# Patient Record
Sex: FEMALE | Race: ASIAN | NOT HISPANIC OR LATINO | Employment: OTHER | URBAN - METROPOLITAN AREA
[De-identification: names, ages, dates, MRNs, and addresses within clinical notes are randomized per-mention and may not be internally consistent; named-entity substitution may affect disease eponyms.]

---

## 2017-03-10 ENCOUNTER — COMMUNICATION - HEALTHEAST (OUTPATIENT)
Dept: FAMILY MEDICINE | Facility: CLINIC | Age: 62
End: 2017-03-10

## 2017-03-10 DIAGNOSIS — F41.9 ANXIETY: ICD-10-CM

## 2017-03-10 DIAGNOSIS — E78.5 HYPERLIPIDEMIA: ICD-10-CM

## 2017-05-27 ENCOUNTER — COMMUNICATION - HEALTHEAST (OUTPATIENT)
Dept: FAMILY MEDICINE | Facility: CLINIC | Age: 62
End: 2017-05-27

## 2017-05-27 DIAGNOSIS — E78.5 HYPERLIPIDEMIA: ICD-10-CM

## 2017-05-27 DIAGNOSIS — F41.9 ANXIETY: ICD-10-CM

## 2017-05-30 ENCOUNTER — COMMUNICATION - HEALTHEAST (OUTPATIENT)
Dept: FAMILY MEDICINE | Facility: CLINIC | Age: 62
End: 2017-05-30

## 2017-05-30 DIAGNOSIS — F41.9 ANXIETY: ICD-10-CM

## 2017-05-30 DIAGNOSIS — E78.5 HYPERLIPIDEMIA: ICD-10-CM

## 2017-07-31 ENCOUNTER — OFFICE VISIT - HEALTHEAST (OUTPATIENT)
Dept: FAMILY MEDICINE | Facility: CLINIC | Age: 62
End: 2017-07-31

## 2017-07-31 ENCOUNTER — COMMUNICATION - HEALTHEAST (OUTPATIENT)
Dept: FAMILY MEDICINE | Facility: CLINIC | Age: 62
End: 2017-07-31

## 2017-07-31 DIAGNOSIS — G25.81 RESTLESS LEG SYNDROME: ICD-10-CM

## 2017-07-31 DIAGNOSIS — E78.2 MIXED HYPERLIPIDEMIA: ICD-10-CM

## 2017-07-31 DIAGNOSIS — F33.9 MAJOR DEPRESSIVE DISORDER, RECURRENT EPISODE (H): ICD-10-CM

## 2017-07-31 LAB
CHOLEST SERPL-MCNC: 203 MG/DL
FASTING STATUS PATIENT QL REPORTED: YES
HDLC SERPL-MCNC: 58 MG/DL
LDLC SERPL CALC-MCNC: 127 MG/DL
TRIGL SERPL-MCNC: 89 MG/DL

## 2017-08-17 ENCOUNTER — COMMUNICATION - HEALTHEAST (OUTPATIENT)
Dept: FAMILY MEDICINE | Facility: CLINIC | Age: 62
End: 2017-08-17

## 2017-08-17 DIAGNOSIS — F41.9 ANXIETY: ICD-10-CM

## 2017-08-17 DIAGNOSIS — E78.5 HYPERLIPIDEMIA: ICD-10-CM

## 2018-01-09 ENCOUNTER — OFFICE VISIT - HEALTHEAST (OUTPATIENT)
Dept: FAMILY MEDICINE | Facility: CLINIC | Age: 63
End: 2018-01-09

## 2018-01-09 DIAGNOSIS — M18.12 DEGENERATIVE ARTHRITIS OF THUMB, LEFT: ICD-10-CM

## 2018-01-09 DIAGNOSIS — G25.81 RESTLESS LEG SYNDROME: ICD-10-CM

## 2018-01-09 LAB — FERRITIN SERPL-MCNC: 163 NG/ML (ref 10–130)

## 2018-01-15 ENCOUNTER — COMMUNICATION - HEALTHEAST (OUTPATIENT)
Dept: FAMILY MEDICINE | Facility: CLINIC | Age: 63
End: 2018-01-15

## 2018-02-06 ENCOUNTER — COMMUNICATION - HEALTHEAST (OUTPATIENT)
Dept: FAMILY MEDICINE | Facility: CLINIC | Age: 63
End: 2018-02-06

## 2018-04-18 ENCOUNTER — COMMUNICATION - HEALTHEAST (OUTPATIENT)
Dept: FAMILY MEDICINE | Facility: CLINIC | Age: 63
End: 2018-04-18

## 2018-04-18 DIAGNOSIS — G25.81 RESTLESS LEG SYNDROME: ICD-10-CM

## 2018-09-12 ENCOUNTER — COMMUNICATION - HEALTHEAST (OUTPATIENT)
Dept: FAMILY MEDICINE | Facility: CLINIC | Age: 63
End: 2018-09-12

## 2018-09-12 DIAGNOSIS — F41.9 ANXIETY: ICD-10-CM

## 2018-09-12 DIAGNOSIS — E78.5 HYPERLIPIDEMIA: ICD-10-CM

## 2018-10-29 ENCOUNTER — OFFICE VISIT - HEALTHEAST (OUTPATIENT)
Dept: FAMILY MEDICINE | Facility: CLINIC | Age: 63
End: 2018-10-29

## 2018-10-29 DIAGNOSIS — E78.5 HYPERLIPIDEMIA: ICD-10-CM

## 2018-10-29 DIAGNOSIS — Z00.00 ROUTINE GENERAL MEDICAL EXAMINATION AT A HEALTH CARE FACILITY: ICD-10-CM

## 2018-10-29 DIAGNOSIS — Z00.00 HEALTH CARE MAINTENANCE: ICD-10-CM

## 2018-10-29 DIAGNOSIS — F33.0 MILD EPISODE OF RECURRENT MAJOR DEPRESSIVE DISORDER (H): ICD-10-CM

## 2018-10-29 DIAGNOSIS — F41.9 ANXIETY: ICD-10-CM

## 2018-10-29 DIAGNOSIS — L29.0 ANAL ITCHING: ICD-10-CM

## 2018-10-29 DIAGNOSIS — Z79.899 MEDICATION MANAGEMENT: ICD-10-CM

## 2018-10-29 DIAGNOSIS — Z12.11 SCREEN FOR COLON CANCER: ICD-10-CM

## 2018-10-29 DIAGNOSIS — Z12.31 VISIT FOR SCREENING MAMMOGRAM: ICD-10-CM

## 2018-10-29 LAB
ALBUMIN SERPL-MCNC: 4 G/DL (ref 3.5–5)
ALP SERPL-CCNC: 46 U/L (ref 45–120)
ALT SERPL W P-5'-P-CCNC: 19 U/L (ref 0–45)
ANION GAP SERPL CALCULATED.3IONS-SCNC: 9 MMOL/L (ref 5–18)
AST SERPL W P-5'-P-CCNC: 17 U/L (ref 0–40)
BILIRUB SERPL-MCNC: 1.7 MG/DL (ref 0–1)
BUN SERPL-MCNC: 13 MG/DL (ref 8–22)
CALCIUM SERPL-MCNC: 9.6 MG/DL (ref 8.5–10.5)
CHLORIDE BLD-SCNC: 106 MMOL/L (ref 98–107)
CHOLEST SERPL-MCNC: 228 MG/DL
CO2 SERPL-SCNC: 26 MMOL/L (ref 22–31)
CREAT SERPL-MCNC: 0.69 MG/DL (ref 0.6–1.1)
FASTING STATUS PATIENT QL REPORTED: YES
GFR SERPL CREATININE-BSD FRML MDRD: >60 ML/MIN/1.73M2
GLUCOSE BLD-MCNC: 96 MG/DL (ref 70–125)
HDLC SERPL-MCNC: 59 MG/DL
HGB BLD-MCNC: 13.2 G/DL (ref 12–16)
LDLC SERPL CALC-MCNC: 146 MG/DL
POTASSIUM BLD-SCNC: 4 MMOL/L (ref 3.5–5)
PROT SERPL-MCNC: 7.1 G/DL (ref 6–8)
SODIUM SERPL-SCNC: 141 MMOL/L (ref 136–145)
TRIGL SERPL-MCNC: 116 MG/DL

## 2018-10-31 ENCOUNTER — COMMUNICATION - HEALTHEAST (OUTPATIENT)
Dept: FAMILY MEDICINE | Facility: CLINIC | Age: 63
End: 2018-10-31

## 2018-11-29 ENCOUNTER — HOSPITAL ENCOUNTER (OUTPATIENT)
Dept: MAMMOGRAPHY | Facility: CLINIC | Age: 63
Discharge: HOME | End: 2018-11-29
Attending: NURSE PRACTITIONER

## 2018-11-29 DIAGNOSIS — Z12.31 VISIT FOR SCREENING MAMMOGRAM: ICD-10-CM

## 2018-11-30 ENCOUNTER — RECORDS - HEALTHEAST (OUTPATIENT)
Dept: ADMINISTRATIVE | Facility: OTHER | Age: 63
End: 2018-11-30

## 2019-05-02 ENCOUNTER — COMMUNICATION - HEALTHEAST (OUTPATIENT)
Dept: TELEHEALTH | Facility: CLINIC | Age: 64
End: 2019-05-02

## 2019-05-02 ENCOUNTER — OFFICE VISIT - HEALTHEAST (OUTPATIENT)
Dept: FAMILY MEDICINE | Facility: CLINIC | Age: 64
End: 2019-05-02

## 2019-05-02 DIAGNOSIS — E78.2 MIXED HYPERLIPIDEMIA: ICD-10-CM

## 2019-05-02 DIAGNOSIS — Z71.84 TRAVEL ADVICE ENCOUNTER: ICD-10-CM

## 2019-05-02 LAB
CHOLEST SERPL-MCNC: 349 MG/DL
FASTING STATUS PATIENT QL REPORTED: YES
HDLC SERPL-MCNC: 64 MG/DL
LDLC SERPL CALC-MCNC: 251 MG/DL
TRIGL SERPL-MCNC: 168 MG/DL

## 2019-05-24 ENCOUNTER — COMMUNICATION - HEALTHEAST (OUTPATIENT)
Dept: FAMILY MEDICINE | Facility: CLINIC | Age: 64
End: 2019-05-24

## 2019-10-14 ENCOUNTER — OFFICE VISIT - HEALTHEAST (OUTPATIENT)
Dept: FAMILY MEDICINE | Facility: CLINIC | Age: 64
End: 2019-10-14

## 2019-10-14 DIAGNOSIS — R05.9 COUGH: ICD-10-CM

## 2019-10-14 DIAGNOSIS — R09.A2 GLOBUS SENSATION: ICD-10-CM

## 2019-10-14 DIAGNOSIS — E78.2 MIXED HYPERLIPIDEMIA: ICD-10-CM

## 2019-10-14 LAB
CHOLEST SERPL-MCNC: 221 MG/DL
FASTING STATUS PATIENT QL REPORTED: YES
HDLC SERPL-MCNC: 53 MG/DL
LDLC SERPL CALC-MCNC: 147 MG/DL
TRIGL SERPL-MCNC: 107 MG/DL
TSH SERPL DL<=0.005 MIU/L-ACNC: 1.44 UIU/ML (ref 0.3–5)

## 2019-10-14 ASSESSMENT — MIFFLIN-ST. JEOR: SCORE: 1116.07

## 2019-10-25 ENCOUNTER — COMMUNICATION - HEALTHEAST (OUTPATIENT)
Dept: FAMILY MEDICINE | Facility: CLINIC | Age: 64
End: 2019-10-25

## 2019-11-08 ENCOUNTER — COMMUNICATION - HEALTHEAST (OUTPATIENT)
Dept: FAMILY MEDICINE | Facility: CLINIC | Age: 64
End: 2019-11-08

## 2019-11-17 ENCOUNTER — COMMUNICATION - HEALTHEAST (OUTPATIENT)
Dept: FAMILY MEDICINE | Facility: CLINIC | Age: 64
End: 2019-11-17

## 2019-11-17 DIAGNOSIS — E78.5 HYPERLIPIDEMIA: ICD-10-CM

## 2019-11-17 DIAGNOSIS — F41.9 ANXIETY: ICD-10-CM

## 2020-03-20 ENCOUNTER — AMBULATORY - HEALTHEAST (OUTPATIENT)
Dept: PHARMACY | Facility: CLINIC | Age: 65
End: 2020-03-20

## 2020-03-20 DIAGNOSIS — F41.9 ANXIETY: ICD-10-CM

## 2020-03-20 DIAGNOSIS — E78.5 HYPERLIPIDEMIA: ICD-10-CM

## 2020-10-04 ENCOUNTER — COMMUNICATION - HEALTHEAST (OUTPATIENT)
Dept: FAMILY MEDICINE | Facility: CLINIC | Age: 65
End: 2020-10-04

## 2020-10-04 DIAGNOSIS — F41.9 ANXIETY: ICD-10-CM

## 2020-10-04 DIAGNOSIS — E78.5 HYPERLIPIDEMIA: ICD-10-CM

## 2020-10-12 ENCOUNTER — OFFICE VISIT - HEALTHEAST (OUTPATIENT)
Dept: FAMILY MEDICINE | Facility: CLINIC | Age: 65
End: 2020-10-12

## 2020-10-12 DIAGNOSIS — F33.0 MILD EPISODE OF RECURRENT MAJOR DEPRESSIVE DISORDER (H): ICD-10-CM

## 2020-10-12 DIAGNOSIS — Z12.31 VISIT FOR SCREENING MAMMOGRAM: ICD-10-CM

## 2020-10-12 DIAGNOSIS — R05.9 COUGH: ICD-10-CM

## 2020-10-12 DIAGNOSIS — F41.9 ANXIETY: ICD-10-CM

## 2020-10-12 DIAGNOSIS — Z00.00 ROUTINE GENERAL MEDICAL EXAMINATION AT A HEALTH CARE FACILITY: ICD-10-CM

## 2020-10-12 DIAGNOSIS — E78.5 HYPERLIPIDEMIA: ICD-10-CM

## 2020-10-12 LAB
ALBUMIN SERPL-MCNC: 4 G/DL (ref 3.5–5)
ALP SERPL-CCNC: 38 U/L (ref 45–120)
ALT SERPL W P-5'-P-CCNC: 17 U/L (ref 0–45)
ANION GAP SERPL CALCULATED.3IONS-SCNC: 7 MMOL/L (ref 5–18)
AST SERPL W P-5'-P-CCNC: 16 U/L (ref 0–40)
BILIRUB SERPL-MCNC: 1.2 MG/DL (ref 0–1)
BUN SERPL-MCNC: 10 MG/DL (ref 8–22)
CALCIUM SERPL-MCNC: 9 MG/DL (ref 8.5–10.5)
CHLORIDE BLD-SCNC: 107 MMOL/L (ref 98–107)
CHOLEST SERPL-MCNC: 213 MG/DL
CO2 SERPL-SCNC: 28 MMOL/L (ref 22–31)
CREAT SERPL-MCNC: 0.74 MG/DL (ref 0.6–1.1)
ERYTHROCYTE [DISTWIDTH] IN BLOOD BY AUTOMATED COUNT: 11.9 % (ref 11–14.5)
FASTING STATUS PATIENT QL REPORTED: YES
GFR SERPL CREATININE-BSD FRML MDRD: >60 ML/MIN/1.73M2
GLUCOSE BLD-MCNC: 92 MG/DL (ref 70–125)
HCT VFR BLD AUTO: 38.8 % (ref 35–47)
HDLC SERPL-MCNC: 48 MG/DL
HGB BLD-MCNC: 13.1 G/DL (ref 12–16)
LDLC SERPL CALC-MCNC: 134 MG/DL
MCH RBC QN AUTO: 30.5 PG (ref 27–34)
MCHC RBC AUTO-ENTMCNC: 33.8 G/DL (ref 32–36)
MCV RBC AUTO: 90 FL (ref 80–100)
PLATELET # BLD AUTO: 247 THOU/UL (ref 140–440)
PMV BLD AUTO: 7.9 FL (ref 7–10)
POTASSIUM BLD-SCNC: 4.1 MMOL/L (ref 3.5–5)
PROT SERPL-MCNC: 7 G/DL (ref 6–8)
RBC # BLD AUTO: 4.29 MILL/UL (ref 3.8–5.4)
SODIUM SERPL-SCNC: 142 MMOL/L (ref 136–145)
TRIGL SERPL-MCNC: 153 MG/DL
WBC: 4.5 THOU/UL (ref 4–11)

## 2020-10-12 RX ORDER — BENZONATATE 100 MG/1
100 CAPSULE ORAL EVERY 6 HOURS PRN
Qty: 30 CAPSULE | Refills: 0 | Status: SHIPPED | OUTPATIENT
Start: 2020-10-12 | End: 2022-03-31

## 2020-10-12 RX ORDER — SIMVASTATIN 40 MG
TABLET ORAL
Qty: 90 TABLET | Refills: 4 | Status: SHIPPED | OUTPATIENT
Start: 2020-10-12 | End: 2022-03-31

## 2020-10-12 ASSESSMENT — PATIENT HEALTH QUESTIONNAIRE - PHQ9: SUM OF ALL RESPONSES TO PHQ QUESTIONS 1-9: 2

## 2020-10-12 ASSESSMENT — MIFFLIN-ST. JEOR: SCORE: 1123

## 2020-10-13 LAB
HPV SOURCE: NORMAL
HUMAN PAPILLOMA VIRUS 16 DNA: NEGATIVE
HUMAN PAPILLOMA VIRUS 18 DNA: NEGATIVE
HUMAN PAPILLOMA VIRUS FINAL DIAGNOSIS: NORMAL
HUMAN PAPILLOMA VIRUS OTHER HR: NEGATIVE
SPECIMEN DESCRIPTION: NORMAL

## 2020-10-20 LAB
BKR LAB AP ABNORMAL BLEEDING: NO
BKR LAB AP BIRTH CONTROL/HORMONES: NORMAL
BKR LAB AP CERVICAL APPEARANCE: NORMAL
BKR LAB AP GYN ADEQUACY: NORMAL
BKR LAB AP GYN INTERPRETATION: NORMAL
BKR LAB AP HPV REFLEX: NORMAL
BKR LAB AP LMP: NORMAL
BKR LAB AP PATIENT STATUS: NORMAL
BKR LAB AP PREVIOUS ABNORMAL: NO
BKR LAB AP PREVIOUS NORMAL: 2014
HIGH RISK?: NO
PATH REPORT.COMMENTS IMP SPEC: NORMAL
RESULT FLAG (HE HISTORICAL CONVERSION): NORMAL

## 2020-10-21 ENCOUNTER — COMMUNICATION - HEALTHEAST (OUTPATIENT)
Dept: FAMILY MEDICINE | Facility: CLINIC | Age: 65
End: 2020-10-21

## 2020-10-30 ENCOUNTER — COMMUNICATION - HEALTHEAST (OUTPATIENT)
Dept: FAMILY MEDICINE | Facility: CLINIC | Age: 65
End: 2020-10-30

## 2020-11-03 ENCOUNTER — COMMUNICATION - HEALTHEAST (OUTPATIENT)
Dept: FAMILY MEDICINE | Facility: CLINIC | Age: 65
End: 2020-11-03

## 2021-05-25 ENCOUNTER — RECORDS - HEALTHEAST (OUTPATIENT)
Dept: ADMINISTRATIVE | Facility: CLINIC | Age: 66
End: 2021-05-25

## 2021-05-27 ENCOUNTER — RECORDS - HEALTHEAST (OUTPATIENT)
Dept: ADMINISTRATIVE | Facility: CLINIC | Age: 66
End: 2021-05-27

## 2021-05-27 ASSESSMENT — PATIENT HEALTH QUESTIONNAIRE - PHQ9: SUM OF ALL RESPONSES TO PHQ QUESTIONS 1-9: 2

## 2021-05-28 ENCOUNTER — RECORDS - HEALTHEAST (OUTPATIENT)
Dept: ADMINISTRATIVE | Facility: CLINIC | Age: 66
End: 2021-05-28

## 2021-05-28 NOTE — PROGRESS NOTES
ASSESSMENT/PLAN:  1. Mixed hyperlipidemia  - Lipid Cascade FASTING    2. Travel advice encounter  63-year-old female is planning a trip to [facility] and [facility].  We discussed travel vaccinations based on the [facility] website.  She is initiated on her hepatitis B and a vaccinations.  She is given a booster of MMR.  She is given a prescription for typhoid oral immunization and Malarone for malaria prophylaxis.  She instructed on side effects and use these medications.  She will follow-up for subsequent dosing of her hepatitis A and B vaccinations.  - typhoid (VIVOTIF) SR capsule; Take 1 capsule by mouth every other day. Complete prior to travel  Dispense: 4 capsule; Refill: 0  - atovaquone-proguanil (MALARONE) 250-100 mg Tab; Take 1 tablet by mouth daily with breakfast. Start 1-2 days prior to travel and continue 7 days after return  Dispense: 35 tablet; Refill: 0      Return in about 5 months (around 10/2/2019) for annual physical, or sooner as needed.    Patient Instructions   Complete the oral typhoid medication before you go.  - Take this as prescribed- every other day for 4 doses.    The malaria medication is sent to your pharmacy.   - Start this before you travel.  - You will take this before, during, and after your travel.  - This will be a daily medication.    MMR, Hepatis A, and Hepatatis B immunizations given today.    Labs done today, we will call you with the results.      Orders Placed This Encounter   Procedures     Hepatitis A adult 2 dose IM (19 yr & older)     Hepatitis B Vaccine Age 20 years and above     MMR vaccine subcutaneous     Lipid Cascade FASTING     Order Specific Question:   Fasting is required?     Answer:   Yes     There are no discontinued medications.      CHIEF COMPLAINT;  Chief Complaint   Patient presents with     Travel Consult       HISTORY OF PRESENT ILLNESS:  Chrystal is a 63 y.o. female presenting to the clinic today for travel consult.     Travel: She is traveling to [facility] with her sister.  She is leaving 6/7/19 and returning 6/28/19. She is agreeable to receiving the MMR, hepatitis A, and hepatitis B immunizations today.     Depression: She continues on sertraline 50 mg daily and reports no adverse side effects. She feels this works well for her.     Hyperlipidemia: She is fasting today and requests fasting lipid labs. She continues on simvastatin 40 mg daily and reports no adverse side effects.     REVIEW OF SYSTEMS:  All other systems are negative.    PFSH:  She is traveling to [facility].     TOBACCO USE:  Social History     Tobacco Use   Smoking Status Never Smoker   Smokeless Tobacco Never Used       VITALS:  Vitals:    05/02/19 1525   BP: 130/80   Patient Site: Left Arm   Patient Position: Sitting   Cuff Size: Adult Regular   Pulse: 73   SpO2: 96%   Weight: 150 lb (68 kg)     Wt Readings from Last 3 Encounters:   05/02/19 150 lb (68 kg)   01/09/18 148 lb (67.1 kg)   07/31/17 142 lb 14.4 oz (64.8 kg)     Body mass index is 29.29 kg/m .    PHYSICAL EXAM:  GENERAL APPEARANCE: Alert, cooperative, no distress, appears stated age  HEAD: Normocephalic, without obvious abnormality, atraumatic  NEUROLOGIC: CNII-XII intact.     RECENT RESULTS  No results found for this or any previous visit (from the past 48 hour(s)).    ADDITIONAL HISTORY SUMMARIZED (2): None.  DECISION TO OBTAIN EXTRA INFORMATION (1): [facility] website referenced for travel to [facility].  RADIOLOGY TESTS (1): None.  LABS (1): Labs ordered today.  MEDICINE TESTS (1): None.  INDEPENDENT REVIEW (2 each): None.    The visit lasted a total of 11 minutes face to face with the patient. Over 50% of the time was spent counseling and educating the patient about upcoming travel.    ILindsay, am scribing for and in the presence of, Dr. Coates.    I, Dr. Coates, personally performed the services described in this documentation, as scribed by Lindsay Fairbanks in my presence, and it is both accurate and complete.    Dragon dictation was used for this note.   Speech recognition errors are a possibility.    MEDICATIONS:  Current Outpatient Medications   Medication Sig Dispense Refill     sertraline (ZOLOFT) 50 MG tablet TAKE 1 TABLET DAILY 90 tablet 3     simvastatin (ZOCOR) 40 MG tablet Take 1 tablet (40 mg total) by mouth at bedtime. 90 tablet 3     atovaquone-proguanil (MALARONE) 250-100 mg Tab Take 1 tablet by mouth daily with breakfast. Start 1-2 days prior to travel and continue 7 days after return 35 tablet 0     typhoid (VIVOTIF) SR capsule Take 1 capsule by mouth every other day. Complete prior to travel 4 capsule 0     No current facility-administered medications for this visit.        Total data points: 2

## 2021-05-28 NOTE — PATIENT INSTRUCTIONS - HE
Complete the oral typhoid medication before you go.  - Take this as prescribed- every other day for 4 doses.    The malaria medication is sent to your pharmacy.   - Start this before you travel.  - You will take this before, during, and after your travel.  - This will be a daily medication.    MMR, Hepatis A, and Hepatatis B immunizations given today.    Labs done today, we will call you with the results.

## 2021-05-29 ENCOUNTER — RECORDS - HEALTHEAST (OUTPATIENT)
Dept: ADMINISTRATIVE | Facility: CLINIC | Age: 66
End: 2021-05-29

## 2021-05-29 NOTE — TELEPHONE ENCOUNTER
Please advise on 5/2/2019 lipid cascade    Lab Results   Component Value Date    CHOL 349 (H) 05/02/2019    CHOL 228 (H) 10/29/2018    CHOL 203 (H) 07/31/2017     Lab Results   Component Value Date    HDL 64 05/02/2019    HDL 59 10/29/2018    HDL 58 07/31/2017     Lab Results   Component Value Date    LDLCALC 251 (H) 05/02/2019    LDLCALC 146 (H) 10/29/2018    LDLCALC 127 07/31/2017     Lab Results   Component Value Date    TRIG 168 (H) 05/02/2019    TRIG 116 10/29/2018    TRIG 89 07/31/2017     No components found for: CHOLHDL

## 2021-05-29 NOTE — TELEPHONE ENCOUNTER
Test Results  Who is calling?: Patient    Who ordered the test:   Divine Coates MD     Type of test: Lab  Date of test:  05/02/2019  Where was the test performed: [facility]  What are your questions/concerns?:  Patient requesting a return phone call to discuss her lab results.  Okay to leave a detailed message?:  Yes

## 2021-05-30 ENCOUNTER — RECORDS - HEALTHEAST (OUTPATIENT)
Dept: ADMINISTRATIVE | Facility: CLINIC | Age: 66
End: 2021-05-30

## 2021-05-31 VITALS — BODY MASS INDEX: 28.9 KG/M2 | WEIGHT: 148 LBS

## 2021-05-31 VITALS — WEIGHT: 142.9 LBS | BODY MASS INDEX: 27.91 KG/M2

## 2021-06-02 NOTE — PATIENT INSTRUCTIONS - HE
TREATMENT OF SORE THROAT  Sore throat treatment: Antibiotics do not help throat pain caused by a virus and are not recommended as they only for bacterial infections. Most sore throats are caused by viruses. Inappropriate use of antibiotics for viral illness can unnecessarily expose patients to side effects like diarrhea, rash, or more serious allergic reactions. Sore throat caused by viral infections usually lasts four to five days. During this time, treatments to reduce pain may be helpful. Several therapies can help to relieve throat pain.  Pain medication -- Over the counter pain relievers such as acetaminophen (Tylenol ) or a non-steroidal anti-inflammatory agent such as ibuprofen or naproxen (Motrin  or Aleve ) have been shown to provide fast and effective relief of sore throat pain.  Oral rinses -- Salt-water gargles are an old stand-by for throat pain. It is not clear that salt water works to relieve pain, but it is unlikely to be harmful. Most recipes suggest 1/4 to 1/2 teaspoon (1.5 to 3.0 g) of salt per one cup (8 ounces or 250 mL) of warm water.   Sprays -- Sprays containing topical anesthetics (eg, benzocaine, phenol) are available to treat sore throat. However, such sprays are no more effective than sucking on hard candy and it is important not to swallow the liquid.  Lozenges -- A variety of lozenges (cough drops) containing topical anesthetics are available to treat throat pain or relieve dryness Lozenges may persist longer in the throat than sprays or gargles and thus, may be more effective for symptom relief [2].  Other treatments -- Other treatments that may help with throat pain include sipping warm beverages (eg, honey or lemon tea, chicken soup), cold beverages, or eating cold or frozen desserts (eg, ice cream, popsicles).  Honey - Honey is a natural mucolytic and can make sore throats with phlegm feel better. Many patients like to mix it with lemon or lemon tea.    Dr. Ku's recipe:  - 1  tablespoon lemon juice  - 3-4 tablespoons water  - honey (about a teaspoon but samantha to taste)  Heat in a small glass and try before bedtime. Veronica with the recipe to your taste preference.

## 2021-06-02 NOTE — PROGRESS NOTES
Assessment/Plan:     Patient presents to clinic for a cough and globus sensation. She has had this before but cannot remember the name of the medication which treated it. Her lung exam was unremarkable, trace erythema in the pharynx with possible bilateral tonsilar edema but no thyroid enlargement appreciated. We chose to do the following:  -TSH pending  - tessalon perles for the cough  - symptomatic handout on sore throat treatment  - increase hydration  - ENT referral if these are insufficient    Repeat lipid panel today.    Problem List Items Addressed This Visit        ENT/CARD/PULM/ENDO Problems    Hyperlipidemia - Primary    Relevant Orders    Lipid [place] FASTING      Other Visit Diagnoses     Globus sensation        Relevant Orders    Ambulatory referral to ENT    Thyroid Stimulating Hormone (TSH)    Cough        Relevant Medications    benzonatate (TESSALON PERLES) 100 MG capsule          Return to clinic in 2 weeks if not improved.     Total time spent with patient was 15 minutes with greater than 50% spent in face-to-face counseling regarding the above plan.    Subjective:      Chrystal Sewell is a 64 y.o. female who presents to clinic for cough and fasting cholesterol screening.    Patient has been experiencing a coughing sensation x 2-3 weeks. It feels as though something is caught in her throat but she cannot clear it. She has had this sensation before, 'a long time ago' which resolved with medication. She cannot remember the name of the medication. Her cough is dry and her throat is itchy. No one else is sick or feeling the same symptoms. She denies shortness of breath and chest pain. She denies dysphagia or dysphonia. She denies allergy symptoms. She has not been hydrating well. She has been using cough drops.     Past Medical History, Family History, and Social History reviewed.     Current Outpatient Medications on File Prior to Visit   Medication Sig Dispense Refill     atovaquone-proguanil (MALARONE)  250-100 mg Tab Take 1 tablet by mouth daily with breakfast. Start 1-2 days prior to travel and continue 7 days after return 35 tablet 0     sertraline (ZOLOFT) 50 MG tablet TAKE 1 TABLET DAILY 90 tablet 3     simvastatin (ZOCOR) 40 MG tablet Take 1 tablet (40 mg total) by mouth at bedtime. 90 tablet 3     typhoid (VIVOTIF) SR capsule Take 1 capsule by mouth every other day. Complete prior to travel 4 capsule 0     No current facility-administered medications on file prior to visit.        Review of systems is as stated in HPI.  Endorses: back pain  The remainder of the 10 system review is otherwise negative.    Objective:     /82   Pulse 65   Ht 5' (1.524 m)   Wt 141 lb (64 kg)   SpO2 96%   BMI 27.54 kg/m   Body mass index is 27.54 kg/m .    Gen: Alert, NAD, appears stated age, normal hygiene   Eyes: conjunctivae without injection, sclera clear, EOMI  ENT/mouth: nares clear, septum midline, absent rhinorrhea, pharyngeal injection present; no thyroid enlargement, neck is supple  Resp: CTAB, no wheezes, rales or ronchi but a productive and raspy cough appreciated    This note has been dictated using voice recognition software. Any grammatical or context distortions are unintentional and inherent to the the software.     Merlyn Tatum MD

## 2021-06-03 VITALS
HEIGHT: 60 IN | OXYGEN SATURATION: 96 % | SYSTOLIC BLOOD PRESSURE: 118 MMHG | DIASTOLIC BLOOD PRESSURE: 82 MMHG | WEIGHT: 141 LBS | HEART RATE: 65 BPM | BODY MASS INDEX: 27.68 KG/M2

## 2021-06-03 VITALS — WEIGHT: 150 LBS | BODY MASS INDEX: 29.29 KG/M2

## 2021-06-03 NOTE — TELEPHONE ENCOUNTER
Refill Approved    Rx renewed per Medication Renewal Policy. Medication was last renewed on 10/29/18.    Alessandra Cali, [facility] Triage/Med Refill 11/17/2019     Requested Prescriptions   Pending Prescriptions Disp Refills     sertraline (ZOLOFT) 50 MG tablet [Pharmacy Med Name: SERTRALINE HCL TABS 50MG] 90 tablet 4     Sig: TAKE 1 TABLET DAILY       SSRI Refill Protocol  Passed - 11/17/2019  5:19 AM        Passed - PCP or prescribing provider visit in last year     Last office visit with prescriber/PCP: 12/9/2015 Nazanin Almanza CNP OR same dept: 10/14/2019 Merlyn Tatum MD OR same specialty: 10/14/2019 Merlyn Tatum MD  Last physical: 10/29/2018 Last MTM visit: Visit date not found   Next visit within 3 mo: Visit date not found  Next physical within 3 mo: Visit date not found  Prescriber OR PCP: Nazanin Almanza CNP  Last diagnosis associated with med order: 1. Anxiety  - sertraline (ZOLOFT) 50 MG tablet [Pharmacy Med Name: SERTRALINE HCL TABS 50MG]; TAKE 1 TABLET DAILY  Dispense: 90 tablet; Refill: 4    2. Hyperlipidemia  - simvastatin (ZOCOR) 40 MG tablet [Pharmacy Med Name: SIMVASTATIN TABS 40MG]; TAKE 1 TABLET AT BEDTIME  Dispense: 90 tablet; Refill: 4    If protocol passes may refill for 12 months if within 3 months of last provider visit (or a total of 15 months).             simvastatin (ZOCOR) 40 MG tablet [Pharmacy Med Name: SIMVASTATIN TABS 40MG] 90 tablet 4     Sig: TAKE 1 TABLET AT BEDTIME       Statins Refill Protocol (Hmg CoA Reductase Inhibitors) Passed - 11/17/2019  5:19 AM        Passed - PCP or prescribing provider visit in past 12 months      Last office visit with prescriber/PCP: 12/9/2015 Nazanin Almanza CNP OR same dept: 10/14/2019 Merlyn Tatum MD OR same specialty: 10/14/2019 Merlyn Tatum MD  Last physical: 10/29/2018 Last MTM visit: Visit date not found   Next visit within 3 mo: Visit date not found  Next physical within 3 mo: Visit date not  found  Prescriber OR PCP: Nazanin Almanza CNP  Last diagnosis associated with med order: 1. Anxiety  - sertraline (ZOLOFT) 50 MG tablet [Pharmacy Med Name: SERTRALINE HCL TABS 50MG]; TAKE 1 TABLET DAILY  Dispense: 90 tablet; Refill: 4    2. Hyperlipidemia  - simvastatin (ZOCOR) 40 MG tablet [Pharmacy Med Name: SIMVASTATIN TABS 40MG]; TAKE 1 TABLET AT BEDTIME  Dispense: 90 tablet; Refill: 4    If protocol passes may refill for 12 months if within 3 months of last provider visit (or a total of 15 months).

## 2021-06-05 ENCOUNTER — RECORDS - HEALTHEAST (OUTPATIENT)
Dept: FAMILY MEDICINE | Facility: CLINIC | Age: 66
End: 2021-06-05

## 2021-06-05 VITALS
BODY MASS INDEX: 27.82 KG/M2 | SYSTOLIC BLOOD PRESSURE: 120 MMHG | OXYGEN SATURATION: 98 % | HEIGHT: 60 IN | DIASTOLIC BLOOD PRESSURE: 80 MMHG | HEART RATE: 68 BPM | WEIGHT: 141.7 LBS

## 2021-06-05 DIAGNOSIS — E80.6 DISORDER OF BILIRUBIN EXCRETION: ICD-10-CM

## 2021-06-12 NOTE — PROGRESS NOTES
ASSESSMENT/PLAN:  1. Routine general medical examination at a health care facility  Healthy 65-year-old female.  She is due for a Pap smear and this is completed today.  She is referred for her first bone density scan.  We discussed the importance of calcium and vitamin D supplementation.  She is up-to-date on her immunizations.  She received her flu vaccination today.  She declines shingles vaccination.  She is referred for her yearly mammogram.  She did Cologuard in 2018 which was negative and she will be due for repeat colon cancer screening in 1 year.  - Gynecologic Cytology (PAP Smear)  - DXA Bone Density Scan; Future    2. Anxiety  Anxiety has been well controlled on the sertraline and she will continue with this medication.  - sertraline (ZOLOFT) 50 MG tablet; TAKE 1 TABLET (50 MG TOTAL) BY MOUTH DAILY/ NOJ 1 LUB TXHUA HNUB PAB NYUAJ SIAB  Dispense: 90 tablet; Refill: 4    3. Hyperlipidemia  She is tolerating the simvastatin well.  She takes 40 mg daily.  She is fasting and we will check a lipid cascade today.  In addition we will check CMP and heme to for monitoring.  - simvastatin (ZOCOR) 40 MG tablet; TAKE 1 TABLET (40 MG TOTAL) BY MOUTH AT BEDTIME/ TXHUA HMO THAUM MUS PW NOJ 1 LUB PAB ZOO NTSHAV MUAJ ROJ  Dispense: 90 tablet; Refill: 4  - HM2(CBC w/o Differential)  - Comprehensive Metabolic Panel  - Lipid Cascade FASTING    4. Visit for screening mammogram  - Mammo Screening Bilateral; Future    5. Mild episode of recurrent major depressive disorder (H)    6. Cough  She has an intermittent cough.  This was pre-existing prior to COVID.  She likes to have Tessalon Perles on hand to be used as needed.  This prescription is renewed.  - benzonatate (TESSALON PERLES) 100 MG capsule; Take 1 capsule (100 mg total) by mouth every 6 (six) hours as needed for cough.  Dispense: 30 capsule; Refill: 0        Dragon dictation was used for this note.  Speech recognition errors are a possibility.      No follow-ups on  file.  There are no Patient Instructions on file for this visit.    Orders Placed This Encounter   Procedures     Mammo Screening Bilateral     Standing Status:   Future     Standing Expiration Date:   1/12/2022     Order Specific Question:   Patient's previous breast density:     Answer:   Scattered fibroglandular density [2]     Order Specific Question:   Can the procedure be changed per Radiologist protocol?     Answer:   Yes     DXA Bone Density Scan     Standing Status:   Future     Standing Expiration Date:   10/12/2021     Order Specific Question:   Reason for Exam (Describe Symptoms):     Answer:   screening     Order Specific Question:   Can the procedure be changed per Radiologist protocol?     Answer:   Yes     Influenza, Recombinant, Inj, Quadrivalent, PF, 18+YRS     HM2(CBC w/o Differential)     Comprehensive Metabolic Panel     Lipid [place] FASTING     Order Specific Question:   Fasting is required?     Answer:   Yes     Medications Discontinued During This Encounter   Medication Reason     atovaquone-proguanil (MALARONE) 250-100 mg Tab Therapy completed     typhoid (VIVOTIF) SR capsule Therapy completed     benzonatate (TESSALON PERLES) 100 MG capsule Reorder     sertraline (ZOLOFT) 50 MG tablet Reorder     simvastatin (ZOCOR) 40 MG tablet Reorder         CHIEF COMPLAINT:  Chief Complaint   Patient presents with     Annual Exam     Flu Vaccine       HISTORY OF PRESENT ILLNESS:  Chrystal is a 65 y.o. female presenting to the clinic today for an annual exam.  She knows she is due for a Pap smear.  She is reminded that her last one was in 2014.  She states that her sister who is a family doctor encourages her to get this done.  She is due for a mammogram.  Her last one was in 2008.  She is up-to-date on her colon cancer screening which she did the Cologuard for.  She is had no new symptoms.  She has been trying to stay active with walking and remain healthy.  Her  is suffering from gastric cancer  which has been stressed for her in addition to the COVID crisis.  She states her moods been fine and anxiety has been overall stable on the sertraline and she like to continue with this.  No new concerns today.    Remainder of 12-point ROS is negative.      Social History     Tobacco Use   Smoking Status Never Smoker   Smokeless Tobacco Never Used       Family History   Problem Relation Age of Onset     No Medical Problems Mother      No Medical Problems Father      No Medical Problems Sister      No Medical Problems Daughter      No Medical Problems Maternal Grandmother      No Medical Problems Maternal Grandfather      No Medical Problems Paternal Grandmother      No Medical Problems Paternal Grandfather      No Medical Problems Maternal Aunt      No Medical Problems Paternal Aunt      BRCA 1/2 Neg Hx      Breast cancer Neg Hx      Cancer Neg Hx      Colon cancer Neg Hx      Endometrial cancer Neg Hx      Ovarian cancer Neg Hx        Social History     Socioeconomic History     Marital status:      Spouse name: Not on file     Number of children: Not on file     Years of education: Not on file     Highest education level: Not on file   Occupational History     Not on file   Social Needs     Financial resource strain: Not on file     Food insecurity     Worry: Not on file     Inability: Not on file     Transportation needs     Medical: Not on file     Non-medical: Not on file   Tobacco Use     Smoking status: Never Smoker     Smokeless tobacco: Never Used   Substance and Sexual Activity     Alcohol use: No     Drug use: No     Sexual activity: Not on file     Comment:    Lifestyle     Physical activity     Days per week: Not on file     Minutes per session: Not on file     Stress: Not on file   Relationships     Social connections     Talks on phone: Not on file     Gets together: Not on file     Attends Buddhism service: Not on file     Active member of club or organization: Not on file     Attends  "meetings of clubs or organizations: Not on file     Relationship status: Not on file     Intimate partner violence     Fear of current or ex partner: Not on file     Emotionally abused: Not on file     Physically abused: Not on file     Forced sexual activity: Not on file   Other Topics Concern     Not on file   Social History Narrative     Not on file       No past surgical history on file.    No Known Allergies    Active Ambulatory Problems     Diagnosis Date Noted     Major Depression, Recurrent      Hyperlipidemia      Primary Snoring      Hyperbilirubinemia      Restless leg syndrome 07/31/2017     Cough 10/12/2020     Resolved Ambulatory Problems     Diagnosis Date Noted     Abdominal Pain In The [place] (Epigastric)      Palpitations      Rosacea      Serum total bilirubin elevated 05/26/2016     No Additional Past Medical History       VITALS:  Vitals:    10/12/20 0857   BP: 120/80   Patient Site: Left Arm   Patient Position: Sitting   Cuff Size: Adult Regular   Pulse: 68   SpO2: 98%   Weight: 141 lb 11.2 oz (64.3 kg)   Height: 5' 0.24\" (1.53 m)     Wt Readings from Last 3 Encounters:   10/12/20 141 lb 11.2 oz (64.3 kg)   10/14/19 141 lb (64 kg)   05/02/19 150 lb (68 kg)     Body mass index is 27.46 kg/m .    PHYSICAL EXAM:  General Appearance: Alert, pleasant, appears stated age  Head: Normocephalic, without obvious abnormality  Eyes: PERRL, conjunctiva/corneas clear, EOM's intact  Ears: Normal TM's and external ear canals, both ears  Nose: Nares normal, septum midline,mucosa normal, no drainage  Throat: Lips, mucosa, and tongue normal; teeth and gums normal; oropharynx is clear  Neck: Supple,without lymphadenopathy or thyromegaly  Lungs: Clear to auscultation bilaterally, respirations unlabored  Breasts: No palpable masses, tenderness, asymmetry, or nipple discharge. No axillary or supraclavicular lymphadenopathy  Heart: Regular rate and rhythm, no murmur   Abdomen: Soft, non-tender, no masses, " no organomegaly  Pelvic: Normally developed genitalia with no external lesions or eruptions. Vagina and cervix show no lesions. No cystocele, No rectocele. Uterus normal.  No adnexal mass or tenderness.  Extremities: Extremities with strong and symmetric pulses, no cyanosis or edema  Skin: Skin color, texture normal, no rashes or lesions  Neuro: Normal    RECENT RESULTS  Recent Results (from the past 48 hour(s))   HM2(CBC w/o Differential)    Collection Time: 10/12/20  9:42 AM   Result Value Ref Range    WBC 4.5 4.0 - 11.0 thou/uL    RBC 4.29 3.80 - 5.40 mill/uL    Hemoglobin 13.1 12.0 - 16.0 g/dL    Hematocrit 38.8 35.0 - 47.0 %    MCV 90 80 - 100 fL    MCH 30.5 27.0 - 34.0 pg    MCHC 33.8 32.0 - 36.0 g/dL    RDW 11.9 11.0 - 14.5 %    Platelets 247 140 - 440 thou/uL    MPV 7.9 7.0 - 10.0 fL       MEDICATIONS:  Current Outpatient Medications   Medication Sig Dispense Refill     benzonatate (TESSALON PERLES) 100 MG capsule Take 1 capsule (100 mg total) by mouth every 6 (six) hours as needed for cough. 30 capsule 0     sertraline (ZOLOFT) 50 MG tablet TAKE 1 TABLET (50 MG TOTAL) BY MOUTH DAILY/ NOJ 1 LUB TXHUA HNUB PAB NYUAJ SIAB 90 tablet 4     simvastatin (ZOCOR) 40 MG tablet TAKE 1 TABLET (40 MG TOTAL) BY MOUTH AT BEDTIME/ TXA HMO THAUM MUS PW NOJ 1 LUB PAB ZOO NTSHAV MUAJ ROJ 90 tablet 4     No current facility-administered medications for this visit.

## 2021-06-12 NOTE — PROGRESS NOTES
ASSESSMENT:  1. Mixed hyperlipidemia  She has been taking her simvastatin without side effects.  She is at 40 mg daily.  Will check a fasting lipid cascade and liver tests.  She has a history of having a slightly elevated bilirubin level.  Workup in the past has been unremarkable for this and it has remained stable.  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  - HM2(CBC w/o Differential)  - Comprehensive Metabolic Panel  - Lipid Cascade FASTING    2. Major depressive disorder, recurrent episode  Her mood is been stable.  She is on sertraline at 50 mg daily.  She does not endorse any depressive symptoms currently.    3. Restless leg syndrome  She does not feel the ropinirole has worked well for up until this point.  She was on a very low dose and I recommend that we increase the dose before we try any medication.  - rOPINIRole (REQUIP) 0.5 MG tablet; Take 1 tab po qhs  Dispense: 90 tablet; Refill: 4          PLAN:  There are no Patient Instructions on file for this visit.    Orders Placed This Encounter   Procedures     HM2(CBC w/o Differential)     Comprehensive Metabolic Panel     Lipid [facility] FASTING     Order Specific Question:   Fasting is required?     Answer:   Yes     Medications Discontinued During This Encounter   Medication Reason     rOPINIRole (REQUIP) 0.25 MG tablet        No Follow-up on file.    CHIEF COMPLAINT:  Chief Complaint   Patient presents with     cholesterol check     fasting     medication check     requip       HISTORY OF PRESENT ILLNESS:  Chrystal is a 61 y.o. female presenting to the clinic today with concerns for medication management. She states that she is no longer taking 0.25 mg ropinirole because she did not notice any change. She denies any side effects associated with this medication. She describes her legs feeling tingly when she is laying in bed and needing to move them to be comfortable.     REVIEW OF SYSTEMS:   Her mood and depression is well-controlled with 50 mg sertraline daily. She  denies sadness or difficulty sleeping and has felt properly motivated. Her cholesterol is seemingly well-controled with 40 mg simvastatin daily, she denies any side effects and is amenable to having labs drawn today. She would like refills of all medications today. All other systems are negative.    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 She tries to go for a walk everyday and states that she wants to lose weight.    TOBACCO USE:  History   Smoking Status     Never Smoker   Smokeless Tobacco     Not on file       VITALS:  Vitals:    07/31/17 1025   BP: 120/80   Patient Site: Left Arm   Patient Position: Sitting   Cuff Size: Adult Regular   Pulse: 72   Resp: 14   Weight: 142 lb 14.4 oz (64.8 kg)     Wt Readings from Last 3 Encounters:   07/31/17 142 lb 14.4 oz (64.8 kg)   05/26/16 143 lb (64.9 kg)   04/12/16 147 lb (66.7 kg)     Body mass index is 27.91 kg/(m^2).    PHYSICAL EXAM:    General Appearance:  Alert, cooperative, no distress, appears stated age   HEENT:  Normal.  No acute findings.   Neck: Supple, symmetrical, no adenopathy.   Lungs:   Clear to auscultation bilaterally, respirations unlabored.  No expiratory wheeze or inspiratory crackles noted.   Heart:  Regular rate and rhythm, S1, S2 normal, no murmur, rub or gallop   Abdomen:   Soft, non-tender, positive bowel sounds, no masses, no organomegaly   Extremities: Extremities normal.  No cyanosis or edema   Skin: Warm, dry.  Skin color, texture, turgor normal, no rashes or lesions   Neurologic: Nonfocal.         ADDITIONAL HISTORY SUMMARIZED (2): None.  DECISION TO OBTAIN EXTRA INFORMATION (1): None.   RADIOLOGY TESTS (1): None.  LABS (1): Labs ordered.  MEDICINE TESTS (1): None.  INDEPENDENT REVIEW (2 each): None.     The visit lasted a total of 7 minutes face to face with the patient. Over 50% of the time was spent counseling and educating the patient about medication management.    Denia WILKINS, am scribing for and in the  presence of, Dr. Coates.    I, Dr. Coates, personally performed the services described in this documentation, as scribed by Denia Fisher in my presence, and it is both accurate and complete.    MEDICATIONS:  Current Outpatient Prescriptions   Medication Sig Dispense Refill     sertraline (ZOLOFT) 50 MG tablet Take 1 tablet (50 mg total) by mouth daily. No further refills until seen in clinic 90 tablet 0     simvastatin (ZOCOR) 40 MG tablet Take 1 tablet (40 mg total) by mouth at bedtime. Must be seen in clinic for more refills. 90 tablet 0     rOPINIRole (REQUIP) 0.5 MG tablet Take 1 tab po qhs 90 tablet 4     No current facility-administered medications for this visit.        Total data points: 1

## 2021-06-12 NOTE — TELEPHONE ENCOUNTER
I reached out to pt and we spoke. I relayed the result message below from Dr. Coates. Pt does not have any questions at this time.

## 2021-06-12 NOTE — TELEPHONE ENCOUNTER
----- Message from Fauzia Quispe CMA sent at 10/30/2020 11:20 AM CDT -----    ----- Message -----  From: Divine Coates MD  Sent: 10/30/2020  11:20 AM CDT  To: Divine Coates Care Team Pool    Please call pt and let her know her results are stable.  She has a mild increase in her cholesterol however I would not recommend cholesterol medication at this time.  Recheck in 1 year.  Let me know she has any questions.  She requests call at the number listed as work

## 2021-06-12 NOTE — TELEPHONE ENCOUNTER
Due to be seen    Rx renewed per Medication Renewal Policy. Medication was last renewed on 3/20/20.    Alessandra Cali, [facility] Triage/Med Refill 10/6/2020     Requested Prescriptions   Pending Prescriptions Disp Refills     sertraline (ZOLOFT) 50 MG tablet [Pharmacy Med Name: SERTRALINE HCL 50 MG TABLET 50 Tablet] 30 tablet 1     Sig: TAKE 1 TABLET (50 MG TOTAL) BY MOUTH DAILY/ NOJ 1 LUB [facility] NYUAJ SIAB       SSRI Refill Protocol  Passed - 10/4/2020 10:22 AM        Passed - PCP or prescribing provider visit in last year     Last office visit with prescriber/PCP: 5/2/2019 Divine Coates MD OR same dept: 10/14/2019 Merlyn Tatum MD OR same specialty: 10/14/2019 Merlyn Tatum MD  Last physical: Visit date not found Last MTM visit: Visit date not found   Next visit within 3 mo: Visit date not found  Next physical within 3 mo: Visit date not found  Prescriber OR PCP: Divine Coates MD  Last diagnosis associated with med order: 1. Anxiety  - sertraline (ZOLOFT) 50 MG tablet [Pharmacy Med Name: SERTRALINE HCL 50 MG TABLET 50 Tablet]; TAKE 1 TABLET (50 MG TOTAL) BY MOUTH DAILY/ NOJ 1 LUB [facility] NYUAJ SIAB  Dispense: 30 tablet; Refill: 1    2. Hyperlipidemia  - simvastatin (ZOCOR) 40 MG tablet [Pharmacy Med Name: SIMVASTATIN 40 MG TABLET 40 Tablet]; TAKE 1 TABLET (40 MG TOTAL) BY MOUTH AT BEDTIME/ [facility] NOJ 1 [facility]  Dispense: 30 tablet; Refill: 1    If protocol passes may refill for 12 months if within 3 months of last provider visit (or a total of 15 months).                simvastatin (ZOCOR) 40 MG tablet [Pharmacy Med Name: SIMVASTATIN 40 MG TABLET 40 Tablet] 30 tablet 1     Sig: TAKE 1 TABLET (40 MG TOTAL) BY MOUTH AT BEDTIME/ [facility]J [facility]       Statins Refill Protocol (Hmg CoA Reductase Inhibitors) Passed - 10/4/2020 10:22 AM        Passed - PCP or prescribing provider visit in past 12  months      Last office visit with prescriber/PCP: 5/2/2019 Divine Coates MD OR same dept: 10/14/2019 Merlyn Tatum MD OR same specialty: 10/14/2019 Merlyn Tatum MD  Last physical: Visit date not found Last MTM visit: Visit date not found   Next visit within 3 mo: Visit date not found  Next physical within 3 mo: Visit date not found  Prescriber OR PCP: Divine Coates MD  Last diagnosis associated with med order: 1. Anxiety  - sertraline (ZOLOFT) 50 MG tablet [Pharmacy Med Name: SERTRALINE HCL 50 MG TABLET 50 Tablet]; TAKE 1 TABLET (50 MG TOTAL) BY MOUTH DAILY/ NOJ 1 LUB TXHUA HNUB PAB NYUAJ SIAB  Dispense: 30 tablet; Refill: 1    2. Hyperlipidemia  - simvastatin (ZOCOR) 40 MG tablet [Pharmacy Med Name: SIMVASTATIN 40 MG TABLET 40 Tablet]; TAKE 1 TABLET (40 MG TOTAL) BY MOUTH AT BEDTIME/ TXA O MARISOL[facility] MUS PW NOJ 1 LUB PAB O NTSHAV MUAJ ROJ  Dispense: 30 tablet; Refill: 1    If protocol passes may refill for 12 months if within 3 months of last provider visit (or a total of 15 months).

## 2021-06-15 NOTE — PROGRESS NOTES
ASSESSMENT:  1. Restless leg syndrome  Restless leg syndrome continues to be an issue at night.  We elect to try any medication for this and she is not experiencing any relief with the Requip.  She started on gabapentin 100 mg at night.  Encouraged her to continue to use magnesium.  Will check ferritin to see if she is iron deficient.  - Ferritin    2. Degenerative arthritis of thumb, left  Her left thumb has become quite painful.  We discussed options including possible injection.  She elected to be seen for this and is referred to hand orthopedic surgery.  - Ambulatory referral to Orthopedic Surgery    We discussed the recommendations of yearly screening mammograms and colon cancer screening.    PLAN:  Patient Instructions   Use ferrous sulfate 325 mg for two weeks to see if this improves the restless leg syndrome.    If not, start gabapentin 100 mg (prescription sent to pharmacy)      Orders Placed This Encounter   Procedures     Influenza, Seasonal,Quad Inj, 36+ MOS     Ferritin     Ambulatory referral to Orthopedic Surgery     Referral Priority:   Routine     Referral Type:   Surgical     Referral Reason:   Evaluation and Treatment     Requested Specialty:   [facility]     Number of Visits Requested:   1     Medications Discontinued During This Encounter   Medication Reason     rOPINIRole (REQUIP) 0.5 MG tablet        No Follow-up on file.    CHIEF COMPLAINT;  Chief Complaint   Patient presents with     Follow-up     discuss mammo and colon     HISTORY OF PRESENT ILLNESS:  Chrystal is a 62 y.o. female presenting to the clinic today for a general follow up regarding medications and health maintenance. She has been well.    Restless Leg Syndrome: She thinks her restless leg syndrome has been okay. She did try increasing the dose, but states that it did not make a difference with her symptoms. She is no longer taking ropinirole 0.5 mg because she did not feel it was working to relieve her symptoms. She  does not believe she has tried gabapentin previously. She states that sometimes it is okay, but that she sometimes has difficulty sleeping because of her symptoms. She describes the discomfort as a tickle or like there is something moving around in her leg. She states that her symptoms are present every day. She denies trying an iron supplement to help with her symptoms. She is amenable to trying iron supplements before gabapentin.    Depression: her mood has been stable. She continues on sertraline 50 mg daily. She reports no adverse side effects.    REVIEW OF SYSTEMS:  She saw a chiropractor for left thumb pain. She received an injection at the chiropractor but does not feel it worked. All other systems are negative.    PFSH:  Reviewed as below.    TOBACCO USE:  History   Smoking Status     Never Smoker   Smokeless Tobacco     Not on file       VITALS:  Vitals:    01/09/18 1552   BP: 110/70   Pulse: 64   Resp: 16   Weight: 148 lb (67.1 kg)     Wt Readings from Last 3 Encounters:   01/09/18 148 lb (67.1 kg)   07/31/17 142 lb 14.4 oz (64.8 kg)   05/26/16 143 lb (64.9 kg)     Body mass index is 28.9 kg/(m^2).    PHYSICAL EXAM:  GENERAL APPEARANCE: Alert, cooperative, no distress, appears stated age  HEAD: Normocephalic, without obvious abnormality, atraumatic  NEUROLOGIC: CNII-XII intact.     RECENT RESULTS  No results found for this or any previous visit (from the past 48 hour(s)).    ADDITIONAL HISTORY SUMMARIZED (2): None.  DECISION TO OBTAIN EXTRA INFORMATION (1): None.  RADIOLOGY TESTS (1): None.  LABS (1): Labs ordered.  MEDICINE TESTS (1): None.  INDEPENDENT REVIEW (2 each): None.    The visit lasted a total of 14 minutes face to face with the patient. Over 50% of the time was spent counseling and educating the patient about restless leg syndrome, depression, and left thumb pain.    Lindsay WILKINS, am scribing for and in the presence of, Dr. Coates.    Dr. Jaxon WILKINS, personally performed the services  described in this documentation, as scribed by Lindsay Fairbanks in my presence, and it is both accurate and complete.    MEDICATIONS:  Current Outpatient Prescriptions   Medication Sig Dispense Refill     sertraline (ZOLOFT) 50 MG tablet Take 1 tablet (50 mg total) by mouth daily. 90 tablet 3     simvastatin (ZOCOR) 40 MG tablet Take 1 tablet (40 mg total) by mouth at bedtime. 90 tablet 3     gabapentin (NEURONTIN) 100 MG capsule Take 1 tablet po at night 30 capsule 2     No current facility-administered medications for this visit.        Total data points: 1

## 2021-06-16 PROBLEM — G25.81 RESTLESS LEG SYNDROME: Status: ACTIVE | Noted: 2017-07-31

## 2021-06-16 PROBLEM — R05.9 COUGH: Status: ACTIVE | Noted: 2020-10-12

## 2021-06-19 NOTE — LETTER
Letter by Divine Coates MD at      Author: Divine Coates MD Service: -- Author Type: --    Filed:  Encounter Date: 11/8/2019 Status: Signed         Chrystal Sewell  [address] [place] [address]      November 8, 2019      Dear Chrystal    In reviewing your records, we have determined a gap in your preventive services. Based on your age and health history, we recommend the follow service.       ? Physical with a Pap Smear  ? HPV test  ? Immunizations  ? Lab work  ? Depression follow up    If you have had the service elsewhere, please contact us so we can update our records. Please let us know if you have transferred your care to another clinic.    Please call [phone] to schedule this appointment.    We believe that a strong preventive care program, including regular physicals and follow-up care is an important part of a healthy lifestyle and we are committed to helping you maintain your health.    Thank you for choosing us as your health care provider.    Sincerely,     [facility]

## 2021-06-19 NOTE — LETTER
Letter by Divine Coates MD at      Author: Divine Coates MD Service: -- Author Type: --    Filed:  Encounter Date: 10/25/2019 Status: Signed         Chrystal Sewell  [address] [place] [address]             October 25, 2019         Dear Ms. Sewell,    Below are the results from your recent visit:    Resulted Orders   Lipid [facility] FASTING   Result Value Ref Range    Cholesterol 221 (H) <=199 mg/dL    Triglycerides 107 <=149 mg/dL    HDL Cholesterol 53 >=50 mg/dL    LDL Calculated 147 (H) <=129 mg/dL    Patient Fasting > 8hrs? Yes    Thyroid Stimulating Hormone (TSH)   Result Value Ref Range    TSH 1.44 0.30 - 5.00 uIU/mL           Please call with questions or contact us using [employer].    Sincerely,        Electronically signed by Divine Coates MD

## 2021-06-21 NOTE — LETTER
Letter by Divine Coates MD at      Author: Divine Coates MD Service: -- Author Type: --    Filed:  Encounter Date: 10/21/2020 Status: (Other)         Chrystal Sewell  [address] [place] [address]             October 21, 2020         Dear MsKarena Donato,    We are happy to inform you that your recent Pap smear and Human Papillomavirus (HPV) test results are normal and negative.    Per current guidelines, you no longer need to have Pap smears completed. Please continue to be seen every year for annual wellness visits.    If you have additional questions regarding this result, please contact our office and we will be happy to assist you.      Sincerely,    Your [facility] Care Team

## 2021-06-21 NOTE — PROGRESS NOTES
"Assessment and Plan:    1. Routine general medical examination at a health care facility  Discussed consuming a healthy diet and exercising.  Discussed importance of routine sunscreen.  Discussed adequate calcium and vitamin D intake.  She is due for mammogram.  She is interested in the cologuard. Discussed shingles vaccine.  She would like to see if her insurance covers it.   - Influenza, Recombinant, Inj, Quadrivalent, PF, 18+YRS  - Hemoglobin    2. Anxiety  3.  Mild episode of recurrent major depressive disorder.  She continues sertraline 50 mg daily.  This is controlled.  - sertraline (ZOLOFT) 50 MG tablet; TAKE 1 TABLET DAILY  Dispense: 90 tablet; Refill: 3    4. Hyperlipidemia  She continues simvastatin 40 mg daily.  Will adjust according to lipid cascade results peer  - simvastatin (ZOCOR) 40 MG tablet; Take 1 tablet (40 mg total) by mouth at bedtime.  Dispense: 90 tablet; Refill: 3  - Lipid Cascade    5. Anal itching  Patient refused rectal examination.  I explained to her that I cannot tell her what is causing anal itching.  I encouraged cleanliness after bowel movements.  She could certainly try over-the-counter hemorrhoid cream.  I encouraged her to follow-up with Dr. Genao.    6. Screen for colon cancer  - Ambulatory referral for Colonoscopy    7. Visit for screening mammogram  - Mammo Screening Bilateral; Future    8. Medication management  - Comprehensive Metabolic Panel      Subjective:     Chrystal is a 63 y.o. female presenting to the clinic for a female physical.     LMP: \"for a few years\" (unsure of when)   Hx of abnormal pap smear: none   Last pap smear: 7/8/14 normal, negative HPV   Perform self-breast exams: none   Vaginal discharge or irritation: none   Sexually active: yes,  for 43 years  Contraception: none   Concerns for STDs: none   Previous pregnancies:six pregnancies, vaginal deliveries     Patient is taking simvastatin 40 mg daily for hyperlipidemia.  Last cholesterol check was on " 7/31/17 with a total cholesterol of 203, triglycerides 89, , HDL 58.  She denies chest pain, shortness of breath with exertion, edema, orthopnea, syncope.  She is taking sertraline 50 mg daily for depression.She says this is well-controlled.  She denies thoughts of suicide.     She is concerned of external anal itching for 2-3 months.  She describes it is intermittent.  Her bowel movements are normal without blood or mucus.      Review of systems:  I performed a 10 point review of systems.  All pertinent positives and negatives are noted in the HPI. All others are negative.     No Known Allergies    Current Outpatient Prescriptions on File Prior to Visit   Medication Sig Dispense Refill     sertraline (ZOLOFT) 50 MG tablet TAKE 1 TABLET DAILY 90 tablet 0     simvastatin (ZOCOR) 40 MG tablet TAKE 1 TABLET AT BEDTIME 90 tablet 0     gabapentin (NEURONTIN) 100 MG capsule Take 1 tablet po at night 30 capsule 9     No current facility-administered medications on file prior to visit.        Social History     Social History     Marital status:      Spouse name: N/A     Number of children: N/A     Years of education: N/A     Occupational History     Not on file.     Social History Main Topics     Smoking status: Never Smoker     Smokeless tobacco: Never Used     Alcohol use No     Drug use: No     Sexual activity: Not on file     Other Topics Concern     Not on file     Social History Narrative       No past medical history on file.    Family History   Problem Relation Age of Onset     No Medical Problems Mother      No Medical Problems Father      No Medical Problems Sister      No Medical Problems Daughter      No Medical Problems Maternal Grandmother      No Medical Problems Maternal Grandfather      No Medical Problems Paternal Grandmother      No Medical Problems Paternal Grandfather      No Medical Problems Maternal Aunt      No Medical Problems Paternal Aunt      BRCA 1/2 Neg Hx      Breast cancer Neg  Hx      Cancer Neg Hx      Colon cancer Neg Hx      Endometrial cancer Neg Hx      Ovarian cancer Neg Hx        No past surgical history on file.    Objective:     Vitals:    10/29/18 0924   BP: 122/68   Pulse: 72       Patient is alert, no obvious distress.   Skin: Warm, dry.  No rashes or lesions. Skin turgor rapid return.   HEENT:  Eyes normal.  Ears normal.  Nose patent, mucosa pink.  Oropharynx mucosa pink, no lesions or tonsil enlargement.   Neck:  Supple, without lymphadenopathy, bruits, JVD. Thyroid normal texture and size.    Lungs:  Clear to auscultation.  No wheezing, rales noted.  Respirations even and unlabored.   Heart:  Regular rate and rhythm.  No murmurs.   Breasts:  Normal.  No surrounding adenopathy.   Abdomen: Soft, nontender.  No organomegaly.  Bowel sounds normoactive.  No guarding or masses noted.   :  Deferred  Rectal:  Deferred per patient   Musculoskeletal:  Full ROM of extremities.  Muscle strength equal +5/5.   Neurological:  Cranial nerves 2-12 intact.

## 2021-07-13 ENCOUNTER — RECORDS - HEALTHEAST (OUTPATIENT)
Dept: ADMINISTRATIVE | Facility: CLINIC | Age: 66
End: 2021-07-13

## 2021-07-21 ENCOUNTER — RECORDS - HEALTHEAST (OUTPATIENT)
Dept: ADMINISTRATIVE | Facility: CLINIC | Age: 66
End: 2021-07-21

## 2021-07-22 ENCOUNTER — RECORDS - HEALTHEAST (OUTPATIENT)
Dept: FAMILY MEDICINE | Facility: CLINIC | Age: 66
End: 2021-07-22

## 2021-07-22 DIAGNOSIS — Z12.31 OTHER SCREENING MAMMOGRAM: ICD-10-CM

## 2021-10-28 DIAGNOSIS — F41.9 ANXIETY: ICD-10-CM

## 2021-10-29 NOTE — TELEPHONE ENCOUNTER
"Routing refill request to provider for review/approval because:  Patient needs to be seen because it has been more than 1 year since last office visit.    Last Written Prescription Date:  10/12/20  Last Fill Quantity: 90,  # refills: 4   Last office visit provider:  10/12/20     Requested Prescriptions   Pending Prescriptions Disp Refills     sertraline (ZOLOFT) 50 MG tablet [Pharmacy Med Name: SERTRALINE HCL 50 MG TABLET 50 Tablet] 30 tablet 4     Sig: TAKE 1 TABLET (50 MG TOTAL) BY MOUTH DAILY/ NOJ 1 LUB TXHUA HNUB PAB NYUAJ SIAB       SSRIs Protocol Failed - 10/28/2021  2:47 PM        Failed - Recent (12 mo) or future (30 days) visit within the authorizing provider's specialty     Patient has had an office visit with the authorizing provider or a provider within the authorizing providers department within the previous 12 mos or has a future within next 30 days. See \"Patient Info\" tab in inbasket, or \"Choose Columns\" in Meds & Orders section of the refill encounter.              Passed - Medication is active on med list        Passed - Patient is age 18 or older        Passed - No active pregnancy on record        Passed - No positive pregnancy test in last 12 months             James Gómez RN 10/29/21 10:26 AM  "

## 2022-02-23 DIAGNOSIS — J45.30 MILD PERSISTENT ASTHMA WITHOUT COMPLICATION: Primary | ICD-10-CM

## 2022-02-28 ENCOUNTER — TELEPHONE (OUTPATIENT)
Dept: LAB | Facility: CLINIC | Age: 67
End: 2022-02-28
Payer: COMMERCIAL

## 2022-02-28 DIAGNOSIS — F33.0 MILD EPISODE OF RECURRENT MAJOR DEPRESSIVE DISORDER (H): ICD-10-CM

## 2022-02-28 DIAGNOSIS — E78.2 MIXED HYPERLIPIDEMIA: Primary | ICD-10-CM

## 2022-03-28 ENCOUNTER — LAB (OUTPATIENT)
Dept: LAB | Facility: CLINIC | Age: 67
End: 2022-03-28
Payer: COMMERCIAL

## 2022-03-28 DIAGNOSIS — F33.0 MILD EPISODE OF RECURRENT MAJOR DEPRESSIVE DISORDER (H): ICD-10-CM

## 2022-03-28 DIAGNOSIS — E78.2 MIXED HYPERLIPIDEMIA: ICD-10-CM

## 2022-03-28 LAB
ALBUMIN SERPL-MCNC: 4.1 G/DL (ref 3.5–5)
ALP SERPL-CCNC: 43 U/L (ref 45–120)
ALT SERPL W P-5'-P-CCNC: 11 U/L (ref 0–45)
ANION GAP SERPL CALCULATED.3IONS-SCNC: 13 MMOL/L (ref 5–18)
AST SERPL W P-5'-P-CCNC: 14 U/L (ref 0–40)
BILIRUB SERPL-MCNC: 1.5 MG/DL (ref 0–1)
BUN SERPL-MCNC: 11 MG/DL (ref 8–22)
CALCIUM SERPL-MCNC: 9.6 MG/DL (ref 8.5–10.5)
CHLORIDE BLD-SCNC: 104 MMOL/L (ref 98–107)
CHOLEST SERPL-MCNC: 328 MG/DL
CO2 SERPL-SCNC: 24 MMOL/L (ref 22–31)
CREAT SERPL-MCNC: 0.69 MG/DL (ref 0.6–1.1)
ERYTHROCYTE [DISTWIDTH] IN BLOOD BY AUTOMATED COUNT: 11.7 % (ref 10–15)
FASTING STATUS PATIENT QL REPORTED: NO
GFR SERPL CREATININE-BSD FRML MDRD: >90 ML/MIN/1.73M2
GLUCOSE BLD-MCNC: 92 MG/DL (ref 70–125)
HBA1C MFR BLD: 5.5 % (ref 0–5.6)
HCT VFR BLD AUTO: 38.2 % (ref 35–47)
HDLC SERPL-MCNC: 53 MG/DL
HGB BLD-MCNC: 13 G/DL (ref 11.7–15.7)
LDLC SERPL CALC-MCNC: 240 MG/DL
MCH RBC QN AUTO: 30.9 PG (ref 26.5–33)
MCHC RBC AUTO-ENTMCNC: 34 G/DL (ref 31.5–36.5)
MCV RBC AUTO: 91 FL (ref 78–100)
PLATELET # BLD AUTO: 231 10E3/UL (ref 150–450)
POTASSIUM BLD-SCNC: 3.9 MMOL/L (ref 3.5–5)
PROT SERPL-MCNC: 7.1 G/DL (ref 6–8)
RBC # BLD AUTO: 4.21 10E6/UL (ref 3.8–5.2)
SODIUM SERPL-SCNC: 141 MMOL/L (ref 136–145)
TRIGL SERPL-MCNC: 175 MG/DL
WBC # BLD AUTO: 7.6 10E3/UL (ref 4–11)

## 2022-03-28 PROCEDURE — 80061 LIPID PANEL: CPT

## 2022-03-28 PROCEDURE — 36415 COLL VENOUS BLD VENIPUNCTURE: CPT

## 2022-03-28 PROCEDURE — 83036 HEMOGLOBIN GLYCOSYLATED A1C: CPT

## 2022-03-28 PROCEDURE — 85027 COMPLETE CBC AUTOMATED: CPT

## 2022-03-28 PROCEDURE — 80053 COMPREHEN METABOLIC PANEL: CPT

## 2022-03-31 ENCOUNTER — OFFICE VISIT (OUTPATIENT)
Dept: FAMILY MEDICINE | Facility: CLINIC | Age: 67
End: 2022-03-31
Payer: COMMERCIAL

## 2022-03-31 VITALS
HEIGHT: 60 IN | SYSTOLIC BLOOD PRESSURE: 112 MMHG | WEIGHT: 141.4 LBS | BODY MASS INDEX: 27.76 KG/M2 | DIASTOLIC BLOOD PRESSURE: 70 MMHG | OXYGEN SATURATION: 99 % | HEART RATE: 65 BPM

## 2022-03-31 DIAGNOSIS — Z00.00 ROUTINE GENERAL MEDICAL EXAMINATION AT A HEALTH CARE FACILITY: Primary | ICD-10-CM

## 2022-03-31 DIAGNOSIS — J45.20 MILD INTERMITTENT ASTHMA WITHOUT COMPLICATION: ICD-10-CM

## 2022-03-31 DIAGNOSIS — E78.2 MIXED HYPERLIPIDEMIA: ICD-10-CM

## 2022-03-31 DIAGNOSIS — F41.9 ANXIETY: ICD-10-CM

## 2022-03-31 PROCEDURE — 90471 IMMUNIZATION ADMIN: CPT | Performed by: FAMILY MEDICINE

## 2022-03-31 PROCEDURE — 90750 HZV VACC RECOMBINANT IM: CPT | Performed by: FAMILY MEDICINE

## 2022-03-31 PROCEDURE — 90714 TD VACC NO PRESV 7 YRS+ IM: CPT | Performed by: FAMILY MEDICINE

## 2022-03-31 PROCEDURE — 90472 IMMUNIZATION ADMIN EACH ADD: CPT | Performed by: FAMILY MEDICINE

## 2022-03-31 PROCEDURE — 99396 PREV VISIT EST AGE 40-64: CPT | Mod: 25 | Performed by: FAMILY MEDICINE

## 2022-03-31 RX ORDER — SIMVASTATIN 40 MG
TABLET ORAL
Qty: 90 TABLET | Refills: 4 | Status: SHIPPED | OUTPATIENT
Start: 2022-03-31 | End: 2023-07-13

## 2022-03-31 RX ORDER — ALBUTEROL SULFATE 90 UG/1
2 AEROSOL, METERED RESPIRATORY (INHALATION) EVERY 4 HOURS PRN
Qty: 18 G | Refills: 4 | Status: SHIPPED | OUTPATIENT
Start: 2022-03-31

## 2022-03-31 NOTE — PROGRESS NOTES
SUBJECTIVE:   CC: Chrystal Sewell is an 66 year old woman who presents for preventive health visit.     Patient has been advised of split billing requirements and indicates understanding: Yes  HPI    Feeling she has mild asthma. Sister is a doctor and recommended she consider an inhaler    PROBLEMS TO ADD ON...- asthma concerns and would like what to do per pt    Today's PHQ-2 Score: No flowsheet data found.    Abuse: Current or Past (Physical, Sexual or Emotional) - No  Do you feel safe in your environment? Yes    Have you ever done Advance Care Planning? (For example, a Health Directive, POLST, or a discussion with a medical provider or your loved ones about your wishes): No, advance care planning information given to patient to review.  Patient declined advance care planning discussion at this time.    Feeling she may have asthma, wanting to walk more and feels limited by breathing.    Social History     Tobacco Use     Smoking status: Never Smoker     Smokeless tobacco: Never Used   Substance Use Topics     Alcohol use: No     If you drink alcohol do you typically have >3 drinks per day or >7 drinks per week? No    No flowsheet data found.No flowsheet data found.      Breast Cancer Screening:  Any new diagnosis of family breast, ovarian, or bowel cancer? No    FHS-7: No flowsheet data found.    Mammogram Screening: Recommended mammography every 1-2 years with patient discussion and risk factor consideration  Pertinent mammograms are reviewed under the imaging tab.    History of abnormal Pap smear: NO - age 30-65 PAP every 5 years with negative HPV co-testing recommended  PAP / HPV Latest Ref Rng & Units 10/12/2020 7/8/2014   PAP Negative for squamous intraepithelial lesion or malignancy. Negative for squamous intraepithelial lesion or malignancy  Electronically signed by Hillary Osorio CT (ASCP) on 10/20/2020 at  8:44 AM   Negative for squamous intraepithelial lesion or malignancy  Electronically signed by  Hillary Osorio, CT (ASCP) on 7/21/2014 at 11:12 AM     HPV16 NEG Negative -   HPV18 NEG Negative -   HRHPV NEG Negative -     Reviewed and updated as needed this visit by clinical staff   Tobacco  Allergies  Meds              Reviewed and updated as needed this visit by Provider                 No past medical history on file.   No past surgical history on file.    Review of Systems  CONSTITUTIONAL: NEGATIVE for fever, chills, change in weight  INTEGUMENTARY/SKIN: NEGATIVE for worrisome rashes, moles or lesions  EYES: NEGATIVE for vision changes or irritation  ENT: NEGATIVE for ear, mouth and throat problems  RESP: NEGATIVE for significant cough or SOB  BREAST: NEGATIVE for masses, tenderness or discharge  CV: NEGATIVE for chest pain, palpitations or peripheral edema  GI: NEGATIVE for nausea, abdominal pain, heartburn, or change in bowel habits  : NEGATIVE for unusual urinary or vaginal symptoms. No vaginal bleeding.  MUSCULOSKELETAL: NEGATIVE for significant arthralgias or myalgia  NEURO: NEGATIVE for weakness, dizziness or paresthesias  PSYCHIATRIC: NEGATIVE for changes in mood or affect      OBJECTIVE:   /70 (BP Location: Left arm, Patient Position: Sitting, Cuff Size: Adult Regular)   Pulse 65   Ht 1.524 m (5')   Wt 64.1 kg (141 lb 6.4 oz)   SpO2 99%   BMI 27.62 kg/m    Physical Exam  GENERAL APPEARANCE: healthy, alert and no distress  EYES: Eyes grossly normal to inspection, PERRL and conjunctivae and sclerae normal  HENT: ear canals and TM's normal, nose and mouth without ulcers or lesions, oropharynx clear and oral mucous membranes moist  NECK: no adenopathy, no asymmetry, masses, or scars and thyroid normal to palpation  RESP: lungs clear to auscultation - no rales, rhonchi or wheezes  BREAST: normal without masses, tenderness or nipple discharge and no palpable axillary masses or adenopathy  CV: regular rate and rhythm, normal S1 S2, no S3 or S4, no murmur, click or rub, no peripheral  edema and peripheral pulses strong  ABDOMEN: soft, nontender, no hepatosplenomegaly, no masses and bowel sounds normal  MS: no musculoskeletal defects are noted and gait is age appropriate without ataxia  SKIN: no suspicious lesions or rashes  NEURO: Normal strength and tone, sensory exam grossly normal, mentation intact and speech normal  PSYCH: mentation appears normal and affect normal/bright    Diagnostic Test Results:  Labs reviewed in Epic  No results found for any visits on 03/31/22.    ASSESSMENT/PLAN:   Chrystal was seen today for physical.    Diagnoses and all orders for this visit:    Routine general medical examination at a health care facility  -     *MA Screening Digital Bilateral; Future    Mixed hyperlipidemia  -     simvastatin (ZOCOR) 40 MG tablet; TAKE 1 TABLET (40 MG TOTAL) BY MOUTH AT BEDTIME/ TXHUA HMO THAUM MUS PW NOJ 1 LUB PAB ZOO NTSHAV MUAJ ROJ    Anxiety  -     sertraline (ZOLOFT) 50 MG tablet; Take 0.5 tablets (25 mg) by mouth daily    Mild intermittent asthma without complication  -     albuterol (PROAIR HFA/PROVENTIL HFA/VENTOLIN HFA) 108 (90 Base) MCG/ACT inhaler; Inhale 2 puffs into the lungs every 4 hours as needed for shortness of breath / dyspnea or wheezing    Other orders  -     TD PRSERV FREE >=7 YRS ADS IM  -     ZOSTER VACCINE RECOMBINANT ADJUVANTED IM NJX        COUNSELING:  Reviewed preventive health counseling, as reflected in patient instructions       Regular exercise       Healthy diet/nutrition       Vision screening       Osteoporosis prevention/bone health    Estimated body mass index is 27.62 kg/m  as calculated from the following:    Height as of this encounter: 1.524 m (5').    Weight as of this encounter: 64.1 kg (141 lb 6.4 oz).    Weight management plan: Discussed healthy diet and exercise guidelines    She reports that she has never smoked. She has never used smokeless tobacco.      Counseling Resources:  ATP IV Guidelines  Pooled Cohorts Equation Calculator  Breast  Cancer Risk Calculator  BRCA-Related Cancer Risk Assessment: FHS-7 Tool  FRAX Risk Assessment  ICSI Preventive Guidelines  Dietary Guidelines for Americans, 2010  USDA's MyPlate  ASA Prophylaxis  Lung CA Screening    Divine Coates MD  [facility]

## 2022-04-19 RX ORDER — FLUTICASONE PROPIONATE 110 UG/1
1 AEROSOL, METERED RESPIRATORY (INHALATION) 2 TIMES DAILY
Qty: 12 G | Refills: 3 | Status: SHIPPED | OUTPATIENT
Start: 2022-04-19 | End: 2023-07-13

## 2022-04-20 ENCOUNTER — TELEPHONE (OUTPATIENT)
Dept: FAMILY MEDICINE | Facility: CLINIC | Age: 67
End: 2022-04-20
Payer: COMMERCIAL

## 2022-04-20 DIAGNOSIS — H92.03 OTALGIA, BILATERAL: Primary | ICD-10-CM

## 2022-04-20 NOTE — TELEPHONE ENCOUNTER
Pts daughter is calling on patients behalf to request a referral for an ENT specialist. Pt was complaining of ear pain still and would like further evaluation. Please place referral if appropriate or inform patient if visit is recommended to further assess.

## 2022-04-21 NOTE — TELEPHONE ENCOUNTER
I placed the referral.  I am happy to see her in clinic if there is a delay in getting in with ENT

## 2022-05-11 ENCOUNTER — TELEPHONE (OUTPATIENT)
Dept: LAB | Facility: CLINIC | Age: 67
End: 2022-05-11
Payer: COMMERCIAL

## 2022-05-11 DIAGNOSIS — E78.2 MIXED HYPERLIPIDEMIA: Primary | ICD-10-CM

## 2022-05-24 ENCOUNTER — ANCILLARY PROCEDURE (OUTPATIENT)
Dept: MAMMOGRAPHY | Facility: CLINIC | Age: 67
End: 2022-05-24
Attending: FAMILY MEDICINE
Payer: COMMERCIAL

## 2022-05-24 DIAGNOSIS — Z00.00 ROUTINE GENERAL MEDICAL EXAMINATION AT A HEALTH CARE FACILITY: ICD-10-CM

## 2022-05-24 PROCEDURE — 77067 SCR MAMMO BI INCL CAD: CPT

## 2022-06-08 ENCOUNTER — OFFICE VISIT (OUTPATIENT)
Dept: AUDIOLOGY | Facility: CLINIC | Age: 67
End: 2022-06-08
Payer: COMMERCIAL

## 2022-06-08 ENCOUNTER — OFFICE VISIT (OUTPATIENT)
Dept: OTOLARYNGOLOGY | Facility: CLINIC | Age: 67
End: 2022-06-08

## 2022-06-08 DIAGNOSIS — H90.3 SENSORINEURAL HEARING LOSS, BILATERAL: Primary | ICD-10-CM

## 2022-06-08 DIAGNOSIS — H90.3 SENSORINEURAL HEARING LOSS (SNHL) OF BOTH EARS: ICD-10-CM

## 2022-06-08 DIAGNOSIS — H93.231 HYPERACUSIS OF RIGHT EAR: Primary | ICD-10-CM

## 2022-06-08 DIAGNOSIS — H93.13 TINNITUS, BILATERAL: ICD-10-CM

## 2022-06-08 PROCEDURE — 92550 TYMPANOMETRY & REFLEX THRESH: CPT | Performed by: AUDIOLOGIST

## 2022-06-08 PROCEDURE — 92557 COMPREHENSIVE HEARING TEST: CPT | Performed by: AUDIOLOGIST

## 2022-06-08 PROCEDURE — 99203 OFFICE O/P NEW LOW 30 MIN: CPT | Performed by: OTOLARYNGOLOGY

## 2022-06-08 NOTE — LETTER
"    6/8/2022         RE: Chrystal Sewell  [address] [place] [address]        Dear Colleague,    Thank you for referring your patient, Chrystal Sewell, to the [facility]. Please see a copy of my visit note below.    CHIEF COMPLAINT:   Diagnoses       Codes Comments    Otalgia, bilateral     H92.03              HISTORY OF PRESENT ILLNESS    Chrystal was seen at the behest of [facility] for hearing loss and tinnitus. She experiences a \"whooshing\" sound in her hears intermittently, typically 1x per week (lasting for a few minutes).  No complaints of hearing loss.   No dizziness or vertigo.  Retired  for [employer].     Diagnoses       Codes Comments    Otalgia, bilateral     H92.03                REVIEW OF SYSTEMS    Review of Systems as per HPI and PMHx, otherwise 10 system review system are negative.     Patient has no known allergies.     There were no vitals taken for this visit.    HEAD: Normal appearance and symmetry:  No cutaneous lesions.      NECK:  supple     EARS: normal TM, EACs    EYES:  EOMI    CN VII/XII:  intact     NOSE:     Dorsum:   straight  Septum:  midline  Mucosa:  moist        ORAL CAVITY/OROPHARYNX:     Lips:  Normal.  Tongue: normal, midline  Mucosa:   no lesions     NECK:  Trachea:  midline.              Thyroid:  normal              Adenopathy:  none        NEURO:   Alert and Oriented     GAIT AND STATION:  normal     RESPIRATORY:   Symmetry and Respiratory effort     PSYCH:  Normal mood and affect     SKIN:   warm and dry         IMPRESSION:    Encounter Diagnoses   Name Primary?     Hyperacusis of right ear Yes     Tinnitus, bilateral      Sensorineural hearing loss (SNHL) of both ears           RECOMMENDATIONS:      Orders Placed This Encounter   Procedures     CT Temporal Bones wo Contrast      Try gentle pressure to ipsilateral neck when experiencing the whooshing  CT temporal bones  Return annually for hearing.       Again, thank you for allowing me to participate in the " care of your patient.        Sincerely,        Mina Liang MD

## 2022-06-08 NOTE — PATIENT INSTRUCTIONS
CT temporal bones ordered today (you will be called) results via myChart  Try gentle pressure to neck on the side of the whooshing next it happends  Return annually for hearing test

## 2022-06-08 NOTE — PROGRESS NOTES
AUDIOLOGY REPORT    SUMMARY: Audiology visit completed. See audiogram for results.     RECOMMENDATIONS: Follow-up with ENT.    Lino Coyle, [facility]  [place] Licensed Audiologist #[id number]

## 2022-06-08 NOTE — PROGRESS NOTES
"CHIEF COMPLAINT:   Diagnoses       Codes Comments    Otalgia, bilateral     H92.03              HISTORY OF PRESENT ILLNESS    Chrystal was seen at the behest of [facility] for hearing loss and tinnitus. She experiences a \"whooshing\" sound in her hears intermittently, typically 1x per week (lasting for a few minutes).  No complaints of hearing loss.   No dizziness or vertigo.  Retired  for [employer].     Diagnoses       Codes Comments    Otalgia, bilateral     H92.03                REVIEW OF SYSTEMS    Review of Systems as per HPI and PMHx, otherwise 10 system review system are negative.     Patient has no known allergies.     There were no vitals taken for this visit.    HEAD: Normal appearance and symmetry:  No cutaneous lesions.      NECK:  supple     EARS: normal TM, EACs    EYES:  EOMI    CN VII/XII:  intact     NOSE:     Dorsum:   straight  Septum:  midline  Mucosa:  moist        ORAL CAVITY/OROPHARYNX:     Lips:  Normal.  Tongue: normal, midline  Mucosa:   no lesions     NECK:  Trachea:  midline.              Thyroid:  normal              Adenopathy:  none        NEURO:   Alert and Oriented     GAIT AND STATION:  normal     RESPIRATORY:   Symmetry and Respiratory effort     PSYCH:  Normal mood and affect     SKIN:   warm and dry         IMPRESSION:    Encounter Diagnoses   Name Primary?     Hyperacusis of right ear Yes     Tinnitus, bilateral      Sensorineural hearing loss (SNHL) of both ears           RECOMMENDATIONS:      Orders Placed This Encounter   Procedures     CT Temporal Bones wo Contrast      Try gentle pressure to ipsilateral neck when experiencing the whooshing  CT temporal bones  Return annually for hearing.   "

## 2022-06-09 ENCOUNTER — HOSPITAL ENCOUNTER (OUTPATIENT)
Dept: CT IMAGING | Facility: HOSPITAL | Age: 67
Discharge: HOME | End: 2022-06-09
Attending: OTOLARYNGOLOGY | Admitting: OTOLARYNGOLOGY
Payer: COMMERCIAL

## 2022-06-09 DIAGNOSIS — H93.231 HYPERACUSIS OF RIGHT EAR: ICD-10-CM

## 2022-06-09 DIAGNOSIS — H93.13 TINNITUS, BILATERAL: ICD-10-CM

## 2022-06-09 PROCEDURE — 70480 CT ORBIT/EAR/FOSSA W/O DYE: CPT

## 2022-06-14 ENCOUNTER — TELEPHONE (OUTPATIENT)
Dept: SURGERY | Facility: CLINIC | Age: 67
End: 2022-06-14
Payer: COMMERCIAL

## 2022-06-14 NOTE — TELEPHONE ENCOUNTER
Tried calling patient. No answer. No voicemail set up.       CLAUDETTE [facility] Cent[facility] Bustamante RN  [facility]  General Surgery  Critical access hospital[address] [facility] [address] [place] [address]  [email or web address]   Office:[phone]  Employed by [employer],

## 2022-06-16 NOTE — TELEPHONE ENCOUNTER
Called pt but could not leave a message.  Mailbox full.    CLAUDETTE [facility]      Cristy WILKINS [facility]  ENT  [address] [address]  [email or web address]   Office:[phone]  Employed by [employer]

## 2022-06-18 ENCOUNTER — HOSPITAL ENCOUNTER (EMERGENCY)
Facility: HOSPITAL | Age: 67
Discharge: HOME | End: 2022-06-18
Attending: EMERGENCY MEDICINE | Admitting: EMERGENCY MEDICINE
Payer: COMMERCIAL

## 2022-06-18 ENCOUNTER — APPOINTMENT (OUTPATIENT)
Dept: CT IMAGING | Facility: HOSPITAL | Age: 67
End: 2022-06-18
Attending: EMERGENCY MEDICINE
Payer: COMMERCIAL

## 2022-06-18 VITALS
RESPIRATION RATE: 22 BRPM | HEART RATE: 76 BPM | TEMPERATURE: 97.8 F | SYSTOLIC BLOOD PRESSURE: 145 MMHG | WEIGHT: 140 LBS | BODY MASS INDEX: 27.34 KG/M2 | OXYGEN SATURATION: 98 % | DIASTOLIC BLOOD PRESSURE: 73 MMHG

## 2022-06-18 DIAGNOSIS — R10.13 ABDOMINAL PAIN, EPIGASTRIC: ICD-10-CM

## 2022-06-18 LAB
ALBUMIN SERPL-MCNC: 3.3 G/DL (ref 3.5–5)
ALP SERPL-CCNC: 43 U/L (ref 45–120)
ALT SERPL W P-5'-P-CCNC: 15 U/L (ref 0–45)
ANION GAP SERPL CALCULATED.3IONS-SCNC: 7 MMOL/L (ref 5–18)
AST SERPL W P-5'-P-CCNC: 11 U/L (ref 0–40)
BILIRUB DIRECT SERPL-MCNC: 0.2 MG/DL
BILIRUB SERPL-MCNC: 0.5 MG/DL (ref 0–1)
BUN SERPL-MCNC: 23 MG/DL (ref 8–22)
CALCIUM SERPL-MCNC: 8.5 MG/DL (ref 8.5–10.5)
CHLORIDE BLD-SCNC: 108 MMOL/L (ref 98–107)
CO2 SERPL-SCNC: 25 MMOL/L (ref 22–31)
CREAT SERPL-MCNC: 0.7 MG/DL (ref 0.6–1.1)
ERYTHROCYTE [DISTWIDTH] IN BLOOD BY AUTOMATED COUNT: 11.9 % (ref 10–15)
GFR SERPL CREATININE-BSD FRML MDRD: >90 ML/MIN/1.73M2
GLUCOSE BLD-MCNC: 111 MG/DL (ref 70–125)
HCT VFR BLD AUTO: 37.1 % (ref 35–47)
HGB BLD-MCNC: 12.1 G/DL (ref 11.7–15.7)
HOLD SPECIMEN: NORMAL
LIPASE SERPL-CCNC: 61 U/L (ref 0–52)
MCH RBC QN AUTO: 30.3 PG (ref 26.5–33)
MCHC RBC AUTO-ENTMCNC: 32.6 G/DL (ref 31.5–36.5)
MCV RBC AUTO: 93 FL (ref 78–100)
PLATELET # BLD AUTO: 223 10E3/UL (ref 150–450)
POTASSIUM BLD-SCNC: 3.4 MMOL/L (ref 3.5–5)
PROT SERPL-MCNC: 6.2 G/DL (ref 6–8)
RBC # BLD AUTO: 3.99 10E6/UL (ref 3.8–5.2)
SODIUM SERPL-SCNC: 140 MMOL/L (ref 136–145)
TROPONIN I SERPL-MCNC: <0.01 NG/ML (ref 0–0.29)
WBC # BLD AUTO: 7.9 10E3/UL (ref 4–11)

## 2022-06-18 PROCEDURE — 96374 THER/PROPH/DIAG INJ IV PUSH: CPT | Mod: 59

## 2022-06-18 PROCEDURE — 36415 COLL VENOUS BLD VENIPUNCTURE: CPT | Performed by: EMERGENCY MEDICINE

## 2022-06-18 PROCEDURE — 250N000011 HC RX IP 250 OP 636: Performed by: EMERGENCY MEDICINE

## 2022-06-18 PROCEDURE — 250N000009 HC RX 250: Performed by: EMERGENCY MEDICINE

## 2022-06-18 PROCEDURE — 258N000003 HC RX IP 258 OP 636: Performed by: EMERGENCY MEDICINE

## 2022-06-18 PROCEDURE — 82248 BILIRUBIN DIRECT: CPT | Performed by: EMERGENCY MEDICINE

## 2022-06-18 PROCEDURE — C9113 INJ PANTOPRAZOLE SODIUM, VIA: HCPCS | Performed by: EMERGENCY MEDICINE

## 2022-06-18 PROCEDURE — 83690 ASSAY OF LIPASE: CPT | Performed by: EMERGENCY MEDICINE

## 2022-06-18 PROCEDURE — 84484 ASSAY OF TROPONIN QUANT: CPT | Performed by: EMERGENCY MEDICINE

## 2022-06-18 PROCEDURE — 96361 HYDRATE IV INFUSION ADD-ON: CPT

## 2022-06-18 PROCEDURE — 99285 EMERGENCY DEPT VISIT HI MDM: CPT | Mod: 25

## 2022-06-18 PROCEDURE — 74177 CT ABD & PELVIS W/CONTRAST: CPT

## 2022-06-18 PROCEDURE — 93005 ELECTROCARDIOGRAM TRACING: CPT | Performed by: EMERGENCY MEDICINE

## 2022-06-18 PROCEDURE — 250N000013 HC RX MED GY IP 250 OP 250 PS 637: Performed by: EMERGENCY MEDICINE

## 2022-06-18 PROCEDURE — 85027 COMPLETE CBC AUTOMATED: CPT | Performed by: EMERGENCY MEDICINE

## 2022-06-18 RX ORDER — FAMOTIDINE 20 MG/1
20 TABLET, FILM COATED ORAL AT BEDTIME
Qty: 30 TABLET | Refills: 0 | Status: SHIPPED | OUTPATIENT
Start: 2022-06-18 | End: 2023-07-13

## 2022-06-18 RX ORDER — SUCRALFATE ORAL 1 G/10ML
1 SUSPENSION ORAL 4 TIMES DAILY
Qty: 414 ML | Refills: 0 | Status: SHIPPED | OUTPATIENT
Start: 2022-06-18 | End: 2023-07-13

## 2022-06-18 RX ORDER — IOPAMIDOL 755 MG/ML
100 INJECTION, SOLUTION INTRAVASCULAR ONCE
Status: COMPLETED | OUTPATIENT
Start: 2022-06-18 | End: 2022-06-18

## 2022-06-18 RX ADMIN — PANTOPRAZOLE SODIUM 40 MG: 40 INJECTION, POWDER, FOR SOLUTION INTRAVENOUS at 17:28

## 2022-06-18 RX ADMIN — IOPAMIDOL 100 ML: 755 INJECTION, SOLUTION INTRAVENOUS at 19:03

## 2022-06-18 RX ADMIN — LIDOCAINE HYDROCHLORIDE 30 ML: 20 SOLUTION ORAL; TOPICAL at 17:30

## 2022-06-18 RX ADMIN — SODIUM CHLORIDE 500 ML: 9 INJECTION, SOLUTION INTRAVENOUS at 17:30

## 2022-06-18 ASSESSMENT — ENCOUNTER SYMPTOMS
NAUSEA: 0
FEVER: 0
VOMITING: 0
ABDOMINAL PAIN: 1
SHORTNESS OF BREATH: 0
COUGH: 0
DIARRHEA: 0
DYSURIA: 0

## 2022-06-18 NOTE — ED TRIAGE NOTES
Pt having epigastric pain for 2 days.  Took mylanta pta.  Nausea without vomiting.       Triage Assessment     Row Name 06/18/22 2667       Triage Assessment (Adult)    Airway WDL WDL       Respiratory WDL    Respiratory WDL WDL       Skin Circulation/Temperature WDL    Skin Circulation/Temperature WDL WDL       Cardiac WDL    Cardiac WDL WDL       Peripheral/Neurovascular WDL    Peripheral Neurovascular WDL WDL       Cognitive/Neuro/Behavioral WDL    Cognitive/Neuro/Behavioral WDL WDL

## 2022-06-18 NOTE — ED PROVIDER NOTES
EMERGENCY DEPARTMENT ENCOUNTER      NAME: Chrystal Sewell  AGE: 66 year old female  YOB: 1955  MRN: [id number]  EVALUATION DATE & TIME: 2022  4:58 PM    PCP: Divine Coates    ED PROVIDER: Merlyn Jarvis M.D.        Chief Complaint   Patient presents with     Abdominal Pain         FINAL IMPRESSION:    1. Abdominal pain, epigastric            MEDICAL DECISION MAKIN year old female with history of GERD and prior gastric ulcer who presents emergency department epigastric and upper abdominal pain now for 5 days.  Pain is getting worse.  CT scan and laboratories overall unremarkable.  Plan at this time is to treat with Pepcid and Carafate and have her follow-up closely with primary care.  Patient agrees with this plan and all of her questions have been answered.  Patient reports that this does feel like her prior ulcer and she too is suspicious that this could be recurrent ulcer.  Denies any hematemesis or black or bloody stools.      ED COURSE:  5:06 PM  I met with the patient to gather history and perform my exam. ED course and treatment discussed.    8:13 PM  Patient is feeling a little bit better after the GI cocktail.  She is relieved by the unremarkable results.  She to feels that this is likely her stomach ulcer coming back.  Lipase is mildly elevated but I do not think this represents true pancreatitis, especially given the duration of her symptoms and the negative CT scan.  Plan at this time is to treat conservatively with prescription for Pepcid and Carafate.  She states she used to be on Zantac many years ago that worked well for her.  We will have her follow-up closely with primary care.  Discussed what to watch for and when to return to the ER and all of her questions have been answered.    I do not think that this represents ACS, PE, ruptured AAA, aortic dissection, bowel obstruction, bowel ischemia, cholecystitis, pancreatitis, appendicitis, diverticulitis, kidney stone,  pyelonephritis, incarcerated or strangulated hernia, ovarian torsion, PID, ectopic pregnancy, tubo-ovarian abscess, viscus perforation, perforated GI ulcer, or other such etiologies at this time.      COVID-19 PPE worn during patient evaluation:  Mask: n95 and homemade masks   Eye Protection: goggles   Gown: none   Hair cover: yes  Face shield: none   Patient wearing a mask: yes     At the conclusion of the encounter I discussed the results of all of the tests and the disposition. Their questions were answered. The patient (and any family present) acknowledged understanding and were agreeable with the care plan.      CONSULTANTS:  none        MEDICATIONS GIVEN IN THE EMERGENCY:  Medications   lidocaine (viscous) (XYLOCAINE) 2 % 15 mL, alum & mag hydroxide-simethicone (MAALOX) 15 mL GI Cocktail (30 mLs Oral Given 6/18/22 1730)   pantoprazole (PROTONIX) IV push injection 40 mg (40 mg Intravenous Given 6/18/22 1728)   0.9% sodium chloride BOLUS (0 mLs Intravenous Stopped 6/18/22 1830)   iopamidol (ISOVUE-370) solution 100 mL (100 mLs Intravenous Given 6/18/22 1903)           NEW PRESCRIPTIONS STARTED AT TODAY'S ER VISIT     Medication List      Started    famotidine 20 MG tablet  Commonly known as: PEPCID  20 mg, Oral, AT BEDTIME     sucralfate 1 GM/10ML suspension  Commonly known as: CARAFATE  1 g, Oral, 4 TIMES DAILY                CONDITION:  stable        DISPOSITION:  discharge home         =================================================================  =================================================================    HPI    Patient information was obtained from: patient and family    Use of Intrepreter: Yes (Family In Person) - Language Hmong, Pt speaks English and family assisted when necessary     Chrystal Sewell is a 66 year old female with history of hyperbilirubinemia, depression, GERD and gastric ulcer who presents to the ER with complaints of epigastric and upper abdominal pain that began on Monday. She  says her pain is worsening. She has not tried any heartburn or reflux type medications.  Patient otherwise denies any fevers, cough, chest pain, shortness of breath, vomiting, diarrhea, dysuria, hematuria.      REVIEW OF SYSTEMS  Review of Systems   Constitutional: Negative for fever.   Respiratory: Negative for cough and shortness of breath.    Cardiovascular: Negative for chest pain.   Gastrointestinal: Positive for abdominal pain. Negative for diarrhea, nausea and vomiting.   Genitourinary: Negative for dysuria.   Allergic/Immunologic: Negative for immunocompromised state.   All other systems reviewed and are negative.        PAST MEDICAL HISTORY:  History reviewed. No pertinent past medical history.      PAST SURGICAL HISTORY:  History reviewed. No pertinent surgical history.      CURRENT MEDICATIONS:    Prior to Admission medications    Medication Sig Start Date End Date Taking? Authorizing Provider   albuterol (PROAIR HFA/PROVENTIL HFA/VENTOLIN HFA) 108 (90 Base) MCG/ACT inhaler Inhale 2 puffs into the lungs every 4 hours as needed for shortness of breath / dyspnea or wheezing 3/31/22   Divine Coates MD   fluticasone (FLOVENT HFA) 110 MCG/ACT inhaler Inhale 1 puff into the lungs 2 times daily 4/19/22   Divine Coates MD   sertraline (ZOLOFT) 50 MG tablet Take 0.5 tablets (25 mg) by mouth daily 3/31/22   Divine Coates MD   simvastatin (ZOCOR) 40 MG tablet TAKE 1 TABLET (40 MG TOTAL) BY MOUTH AT BEDTIME/ TXHUA HMO THAUM MUS PW NOJ 1 LUB PAB ZOO NTSHAV MUAJ ROJ 3/31/22   Divine Coates MD         ALLERGIES:  No Known Allergies      FAMILY HISTORY:  Family History   Problem Relation Age of Onset     No Known Problems Mother      No Known Problems Father      No Known Problems Sister      No Known Problems Daughter      No Known Problems Maternal Grandmother      No Known Problems Maternal Grandfather      No Known Problems Paternal Grandmother      No Known Problems Paternal Grandfather       No Known Problems Maternal Aunt      No Known Problems Paternal Aunt      Hereditary Breast and Ovarian Cancer Syndrome No family hx of      Breast Cancer No family hx of      Cancer No family hx of      Colon Cancer No family hx of      Endometrial Cancer No family hx of      Ovarian Cancer No family hx of          SOCIAL HISTORY:  Social History     Socioeconomic History     Marital status:    Tobacco Use     Smoking status: Never Smoker     Smokeless tobacco: Never Used   Substance and Sexual Activity     Alcohol use: No     Drug use: No         VITALS:  Patient Vitals for the past 24 hrs:   BP Temp Temp src Pulse Resp SpO2 Weight   06/18/22 1845 (!) 145/73 -- -- 76 22 98 % --   06/18/22 1655 -- -- -- -- -- -- 63.5 kg (140 lb)   06/18/22 1654 119/65 97.8  F (36.6  C) Temporal 78 16 95 % --       Wt Readings from Last 3 Encounters:   06/18/22 63.5 kg (140 lb)   03/31/22 64.1 kg (141 lb 6.4 oz)   10/12/20 64.3 kg (141 lb 11.2 oz)         PHYSICAL EXAM    Constitutional:  Well developed, Well nourished, NAD, GCS 15  HENT:  Normocephalic, Atraumatic, Bilateral external ears normal, Nose normal. Neck- Supple, No stridor.   Eyes:  PERRL, EOMI, Conjunctiva normal, No discharge.  Respiratory:  Normal breath sounds, No respiratory distress, No wheezing, Speaks full sentences easily. No cough.   Cardiovascular:  Normal heart rate, Regular rhythm, No rubs, No gallops. Chest wall nontender.  GI:  No excessive obesity.  Bowel sounds normal, Soft, mild epigastric tenderness, No masses, No flank tenderness. No rebound or guarding.   : deferred  Musculoskeletal: 2+ DP pulses.  No cyanosis, No clubbing. Good range of motion in all major joints. No major deformities noted.   Integument:  Warm, Dry, No erythema, No rash.  No petechiae.   Neurologic:  Alert & oriented x 3, No focal deficits noted.   Psychiatric:  Affect normal, Cooperative         LAB:  All pertinent labs reviewed and interpreted.  Recent Results (from  the past 24 hour(s))   CBC (+ platelets, no diff)    Collection Time: 06/18/22  5:26 PM   Result Value Ref Range    WBC Count 7.9 4.0 - 11.0 10e3/uL    RBC Count 3.99 3.80 - 5.20 10e6/uL    Hemoglobin 12.1 11.7 - 15.7 g/dL    Hematocrit 37.1 35.0 - 47.0 %    MCV 93 78 - 100 fL    MCH 30.3 26.5 - 33.0 pg    MCHC 32.6 31.5 - 36.5 g/dL    RDW 11.9 10.0 - 15.0 %    Platelet Count 223 150 - 450 10e3/uL   Troponin I (now)    Collection Time: 06/18/22  5:26 PM   Result Value Ref Range    Troponin I <0.01 0.00 - 0.29 ng/mL   Extra Red Top Tube    Collection Time: 06/18/22  5:26 PM   Result Value Ref Range    Hold Specimen JIC    Extra Green Top (Lithium Heparin) Tube    Collection Time: 06/18/22  5:26 PM   Result Value Ref Range    Hold Specimen JIC    Extra Purple Top Tube    Collection Time: 06/18/22  5:26 PM   Result Value Ref Range    Hold Specimen JIC    Extra Blue Top Tube    Collection Time: 06/18/22  5:27 PM   Result Value Ref Range    Hold Specimen JIC    Basic metabolic panel    Collection Time: 06/18/22  6:09 PM   Result Value Ref Range    Sodium 140 136 - 145 mmol/L    Potassium 3.4 (L) 3.5 - 5.0 mmol/L    Chloride 108 (H) 98 - 107 mmol/L    Carbon Dioxide (CO2) 25 22 - 31 mmol/L    Anion Gap 7 5 - 18 mmol/L    Urea Nitrogen 23 (H) 8 - 22 mg/dL    Creatinine 0.70 0.60 - 1.10 mg/dL    Calcium 8.5 8.5 - 10.5 mg/dL    Glucose 111 70 - 125 mg/dL    GFR Estimate >90 >60 mL/min/1.73m2   Hepatic function panel    Collection Time: 06/18/22  6:09 PM   Result Value Ref Range    Bilirubin Total 0.5 0.0 - 1.0 mg/dL    Bilirubin Direct 0.2 <=0.5 mg/dL    Protein Total 6.2 6.0 - 8.0 g/dL    Albumin 3.3 (L) 3.5 - 5.0 g/dL    Alkaline Phosphatase 43 (L) 45 - 120 U/L    AST 11 0 - 40 U/L    ALT 15 0 - 45 U/L   Lipase    Collection Time: 06/18/22  6:09 PM   Result Value Ref Range    Lipase 61 (H) 0 - 52 U/L       No results found for: ABORH        RADIOLOGY:  Reviewed all pertinent imaging. Please see official radiology  report.    CT Abdomen Pelvis w Contrast   Final Result   IMPRESSION:    1.  No visualized explanation for patient's abdominal pain.            EKG:    Indication: upper abd pain    Performed at: 17:05p  Impression: Sinus rhythm at 76 bpm.  Flipped T waves noted in lead aVR and V1- V3.  AL interval 166 ms, QRS 92 ms,  ms.  Nonspecific ST changes compared to May 5, 2005.      I have independently reviewed and interpreted the EKG(s) documented above.        PROCEDURES:  none      I, Kennedi Romero, am serving as a scribe to document services personally performed by Dr. Merlyn Jarvis based on my observation and the provider's statements to me. I, Dr. Merlyn Jarvis MD attest that Kennedi Romero is acting in a scribe capacity, has observed my performance of the services and has documented them in accordance with my direction.        Merlyn Jarvis M.D. [facility]  Emergency Medicine and Medical Toxicology  [facility] EMERGENCY DEPARTMENT  [facility] [facility] [address]  [phone]  Dept: [phone]           Merlyn Jarvis MD  06/18/22 2015

## 2022-06-19 LAB
ATRIAL RATE - MUSE: 76 BPM
DIASTOLIC BLOOD PRESSURE - MUSE: NORMAL MMHG
INTERPRETATION ECG - MUSE: NORMAL
P AXIS - MUSE: 77 DEGREES
PR INTERVAL - MUSE: 166 MS
QRS DURATION - MUSE: 92 MS
QT - MUSE: 400 MS
QTC - MUSE: 450 MS
R AXIS - MUSE: 73 DEGREES
SYSTOLIC BLOOD PRESSURE - MUSE: NORMAL MMHG
T AXIS - MUSE: 71 DEGREES
VENTRICULAR RATE- MUSE: 76 BPM

## 2022-06-19 NOTE — DISCHARGE INSTRUCTIONS
Your lab tests and the CT scan look good. This may be your stomach ulcer coming back.    Take the medicines as directed.    Call on Monday and make an appointment to see your family doctor for this week.    Return to emergency department if you develop worse pain, chest pain, difficulty breathing, vomiting, blood in your stools or black stools, or any other concerns.    Thank you for choosing [facility] Emergency Department.  It has been my pleasure caring for you today.     ~Dr. Simona MD

## 2022-06-20 ENCOUNTER — TELEPHONE (OUTPATIENT)
Dept: FAMILY MEDICINE | Facility: CLINIC | Age: 67
End: 2022-06-20
Payer: COMMERCIAL

## 2022-06-20 NOTE — TELEPHONE ENCOUNTER
Reason for Call:  E/D follow up appointment    Detailed comments: Patient was seen in the E/D on 6/18/2022. Everton, patient's daughter called to schedule with . I advised Everton that  does not have openings. Patient is scheduled to see Diana Soares on Friday, 6/24. However, Everton would like patient to be seen by .     In addition, the E/D doctor mentioned an endoscopy to be performed. Everton is wondering if  can place that order.     Please advise.     Phone Number Patient can be reached at: Home number on file [phone] (only if CATALINO Jarrell calls. If CATALINO Jarrell is not available to translate, please call daughter Everton.     Best Time: any    Can we leave a detailed message on this number? YES    Call taken on 6/20/2022 at 3:32 PM by Sandi Mckeon

## 2022-06-20 NOTE — TELEPHONE ENCOUNTER
I called and spoke with pt today. Informed pt that we understand herself and daughter wanted to get in to see Dr. Coates. Due to unavailable slots, I encouraged her to keep her appt with Diana Soares for 6/24. If there is anything, we will contact pt and can have both of the providers connect, if needed. Pt understands.

## 2022-06-21 NOTE — TELEPHONE ENCOUNTER
Called pt x3.  No pt answer and mailbox full.  Mailed pt the results to address listed on chart on 6/21.    [facility]      Cristy Preston RN  [facility]  ENT  Count includes the [facility] [address]  [email or web address]   Office:[phone]  Employed by [facility]

## 2022-06-24 ENCOUNTER — VIRTUAL VISIT (OUTPATIENT)
Dept: FAMILY MEDICINE | Facility: CLINIC | Age: 67
End: 2022-06-24
Payer: COMMERCIAL

## 2022-06-24 DIAGNOSIS — K21.9 GASTROESOPHAGEAL REFLUX DISEASE, UNSPECIFIED WHETHER ESOPHAGITIS PRESENT: ICD-10-CM

## 2022-06-24 DIAGNOSIS — R10.13 ABDOMINAL PAIN, EPIGASTRIC: ICD-10-CM

## 2022-06-24 DIAGNOSIS — Z09 HOSPITAL DISCHARGE FOLLOW-UP: Primary | ICD-10-CM

## 2022-06-24 PROCEDURE — 99213 OFFICE O/P EST LOW 20 MIN: CPT | Mod: GT | Performed by: NURSE PRACTITIONER

## 2022-06-24 NOTE — PROGRESS NOTES
Chrystal is a 66 year old who is being evaluated via a billable video visit.      How would you like to obtain your AVS? MyChart  If the video visit is dropped, the invitation should be resent by: Text to cell phone: [phone]  Will anyone else be joining your video visit? No          Assessment & Plan     Hospital discharge follow-up  Abdominal pain, epigastric  Reviewed hospital discharge summaries, imaging and labs with the patient virtually today.  Symptoms have been improving with use of Carafate and Pepcid.  Reassured that abdominal CT and lab work was unremarkable in the ER.  Symptoms seem most consistent with reflux or possible ulcer.  I recommend that she continue with Pepcid and Carafate for the remainder of the week.  If symptoms fail to improve over the next 1 to 2 weeks or if she develops any recurring or worsening symptoms she is advised to notify us.  We discussed possible work-up, EGD or referral if needed.    Gastroesophageal reflux disease, unspecified whether esophagitis present  Discussed lifestyle modifications for management of reflux including limiting large, spicy meals, limiting caffeine, chocolate, alcohol etc.  She will continue monitor for any pattern to her epigastric pain and follow-up if symptoms worsen.        No follow-ups on file.    DARLYN [facility]   Chrystal is a 66 year old accompanied by her daughter., presenting for the following health issues:  No chief complaint on file.      HPI     Patient is here today for [facility] hospital follow-up visit.  She is accompanied by her daughter who is helping translate.  Patient was seen in the emergency department on 6/18/2022 with concerns of epigastric discomfort and abdominal pain that was going on for 5 days prior.  She had CT scan and labs completed in the ER which were unremarkable.  Symptoms thought to be related to reflux, possible ulcer and she was recommended treatment with Pepcid  and Carafate.  She has been doing this for the last 5 days or so and reports some improvement.  She continues to have some discomfort but it is more mild than it was.  She has been taking the Carafate as prescribed.  She denies any signs of bleeding in her stool such as black stools.  No change in bowel pattern or urination.  No hematemesis.  She reports feeling well otherwise, denies any systemic symptoms today.  She wonders about an ulcer.  States that she drinks coffee very frequently and has had ulcers in the past that feels similar.    Review of Systems   Review of Systems - pertinent positives noted in HPI, otherwise ROS is negative.        Objective           Vitals:  No vitals were obtained today due to virtual visit.    Physical Exam   GENERAL: Healthy, alert and no distress  EYES: Eyes grossly normal to inspection.  No discharge or erythema, or obvious scleral/conjunctival abnormalities.  RESP: No audible wheeze, cough, or visible cyanosis.  No visible retractions or increased work of breathing.    SKIN: Visible skin clear. No significant rash, abnormal pigmentation or lesions.  NEURO: Cranial nerves grossly intact.  Mentation and speech appropriate for age.  PSYCH: Mentation appears normal, affect normal/bright, judgement and insight intact, normal speech and appearance well-groomed.                Video-Visit Details    Video Start Time: 12:19 PM    Type of service:  Video Visit    Video End Time:12:34 PM    Originating Location (pt. Location): Home    Distant Location (provider location):  [facility]     Platform used for Video Visit: Ruba    .  ..

## 2022-07-12 ENCOUNTER — LAB (OUTPATIENT)
Dept: LAB | Facility: CLINIC | Age: 67
End: 2022-07-12
Payer: COMMERCIAL

## 2022-07-12 DIAGNOSIS — E78.2 MIXED HYPERLIPIDEMIA: ICD-10-CM

## 2022-07-12 LAB
ALBUMIN SERPL BCG-MCNC: 4.5 G/DL (ref 3.5–5.2)
ALP SERPL-CCNC: 48 U/L (ref 35–104)
ALT SERPL W P-5'-P-CCNC: 22 U/L (ref 10–35)
ANION GAP SERPL CALCULATED.3IONS-SCNC: 12 MMOL/L (ref 7–15)
AST SERPL W P-5'-P-CCNC: 21 U/L (ref 10–35)
BILIRUB SERPL-MCNC: 1 MG/DL
BUN SERPL-MCNC: 12.8 MG/DL (ref 8–23)
CALCIUM SERPL-MCNC: 9.5 MG/DL (ref 8.8–10.2)
CHLORIDE SERPL-SCNC: 103 MMOL/L (ref 98–107)
CHOLEST SERPL-MCNC: 226 MG/DL
CREAT SERPL-MCNC: 0.63 MG/DL (ref 0.51–0.95)
DEPRECATED HCO3 PLAS-SCNC: 27 MMOL/L (ref 22–29)
GFR SERPL CREATININE-BSD FRML MDRD: >90 ML/MIN/1.73M2
GLUCOSE SERPL-MCNC: 88 MG/DL (ref 70–99)
HDLC SERPL-MCNC: 56 MG/DL
LDLC SERPL CALC-MCNC: 135 MG/DL
NONHDLC SERPL-MCNC: 170 MG/DL
POTASSIUM SERPL-SCNC: 4 MMOL/L (ref 3.4–5.3)
PROT SERPL-MCNC: 7.3 G/DL (ref 6.4–8.3)
SODIUM SERPL-SCNC: 142 MMOL/L (ref 136–145)
TRIGL SERPL-MCNC: 176 MG/DL

## 2022-07-12 PROCEDURE — 80053 COMPREHEN METABOLIC PANEL: CPT

## 2022-07-12 PROCEDURE — 36415 COLL VENOUS BLD VENIPUNCTURE: CPT

## 2022-07-12 PROCEDURE — 80061 LIPID PANEL: CPT

## 2022-10-19 ENCOUNTER — TELEPHONE (OUTPATIENT)
Dept: FAMILY MEDICINE | Facility: CLINIC | Age: 67
End: 2022-10-19

## 2022-10-19 NOTE — TELEPHONE ENCOUNTER
Reason for Call: Request for an order or referral:    Order or referral being requested: c pap machine    Date needed: at your convenience    Has the patient been seen by the PCP for this problem? YES    Additional comments: pts sissyer stated pt has discussed in the past getting a c pap machine due to pts snoring. Not sure if pt will need to do another sleep study    Phone number Patient can be reached at:  Yamil Toscano [phone]    Best Time:      Can we leave a detailed message on this number?  YES    Call taken on 10/19/2022 at 4:02 PM by Marci Oneil

## 2022-10-24 NOTE — TELEPHONE ENCOUNTER
This will need to come through the sleep center. She may not need another sleep study but should schedule a consult with them

## 2022-10-24 NOTE — TELEPHONE ENCOUNTER
Patient's daughter informed. Will call the sleep center and see what they can do for her.    Meghna Sewell RN on 10/24/2022 at 2:41 PM

## 2022-10-26 ENCOUNTER — TELEPHONE (OUTPATIENT)
Dept: SLEEP MEDICINE | Facility: CLINIC | Age: 67
End: 2022-10-26

## 2022-10-26 NOTE — TELEPHONE ENCOUNTER
Reason for Call:  Other call back and prescription    Detailed comments: Would like a cpap machine - believes she had sleep study  done within 5 yrs with [facility]    Phone Number Patient can be reached at: Other phone number:  [phone]    Best Time: anytime    Can we leave a detailed message on this number? YES    Call taken on 10/26/2022 at 9:51 AM by Erin Ayon

## 2022-11-01 NOTE — TELEPHONE ENCOUNTER
Return call made and pt informed that she would have to schedule appt due to last visit being in 2015.  Pt states she does not wish to schedule any appts and to disregard.      SALIMA Fallon

## 2023-03-28 ENCOUNTER — OFFICE VISIT (OUTPATIENT)
Dept: FAMILY MEDICINE | Facility: CLINIC | Age: 68
End: 2023-03-28
Payer: COMMERCIAL

## 2023-03-28 VITALS
RESPIRATION RATE: 20 BRPM | BODY MASS INDEX: 28.67 KG/M2 | HEART RATE: 71 BPM | DIASTOLIC BLOOD PRESSURE: 74 MMHG | TEMPERATURE: 98.8 F | SYSTOLIC BLOOD PRESSURE: 125 MMHG | OXYGEN SATURATION: 97 % | WEIGHT: 146.8 LBS

## 2023-03-28 DIAGNOSIS — K64.4 EXTERNAL HEMORRHOIDS: Primary | ICD-10-CM

## 2023-03-28 PROCEDURE — 99213 OFFICE O/P EST LOW 20 MIN: CPT | Performed by: FAMILY MEDICINE

## 2023-03-28 RX ORDER — HYDROCORTISONE 2.5 %
CREAM (GRAM) TOPICAL 2 TIMES DAILY
Qty: 30 G | Refills: 0 | Status: SHIPPED | OUTPATIENT
Start: 2023-03-28 | End: 2023-07-13

## 2023-03-28 NOTE — PROGRESS NOTES
Assessment:       External hemorrhoids  - hydrocortisone 2.5 % cream  Dispense: 30 g; Refill: 0         Plan:     Patient with evidence of nonthrombosed external hemorrhoids.  Hydrocortisone rectal cream prescribed.  Discussed self care at home and increasing water and fiber intake.  Follow-up with PCP in 2 weeks if symptoms getting worse or not improving as expected.    MEDICATIONS:   Orders Placed This Encounter   Medications     hydrocortisone 2.5 % cream     Sig: Apply topically 2 times daily     Dispense:  30 g     Refill:  0       Subjective:       67 year old female presents for evaluation 1-1/2-month history of external hemorrhoids that are itchy and irritated.  She is used a topical over-the-counter cream as well as Vaseline which has not seemed to help.  They are not painful.  She has had no bleeding.    Patient Active Problem List   Diagnosis     Major Depression, Recurrent     Hyperlipidemia     Primary Snoring     Hyperbilirubinemia     Restless leg syndrome     Cough       No past medical history on file.    No past surgical history on file.    Current Outpatient Medications   Medication     albuterol (PROAIR HFA/PROVENTIL HFA/VENTOLIN HFA) 108 (90 Base) MCG/ACT inhaler     famotidine (PEPCID) 20 MG tablet     fluticasone (FLOVENT HFA) 110 MCG/ACT inhaler     hydrocortisone 2.5 % cream     sertraline (ZOLOFT) 50 MG tablet     simvastatin (ZOCOR) 40 MG tablet     sucralfate (CARAFATE) 1 GM/10ML suspension     No current facility-administered medications for this visit.       No Known Allergies    Family History   Problem Relation Age of Onset     No Known Problems Mother      No Known Problems Father      No Known Problems Sister      No Known Problems Daughter      No Known Problems Maternal Grandmother      No Known Problems Maternal Grandfather      No Known Problems Paternal Grandmother      No Known Problems Paternal Grandfather      No Known Problems Maternal Aunt      No Known Problems Paternal  Aunt      Hereditary Breast and Ovarian Cancer Syndrome No family hx of      Breast Cancer No family hx of      Cancer No family hx of      Colon Cancer No family hx of      Endometrial Cancer No family hx of      Ovarian Cancer No family hx of        Social History     Socioeconomic History     Marital status:      Spouse name: None     Number of children: None     Years of education: None     Highest education level: None   Tobacco Use     Smoking status: Never     Passive exposure: Never     Smokeless tobacco: Never   Substance and Sexual Activity     Alcohol use: No     Drug use: No         Review of Systems  Pertinent items are noted in HPI.      Objective:     /74 (BP Location: Left arm, Patient Position: Sitting, Cuff Size: Adult Regular)   Pulse 71   Temp 98.8  F (37.1  C) (Oral)   Resp 20   Wt 66.6 kg (146 lb 12.8 oz)   SpO2 97%   BMI 28.67 kg/m       General appearance: alert, appears stated age and cooperative  Rectal: Patient has a couple of small soft external hemorrhoids noted.  No bleeding noted.  No evidence of thrombosis.        This note has been dictated using voice recognition software. Any grammatical or context distortions are unintentional and inherent to the software

## 2023-07-13 ENCOUNTER — VIRTUAL VISIT (OUTPATIENT)
Dept: PHARMACY | Facility: CLINIC | Age: 68
End: 2023-07-13
Payer: COMMERCIAL

## 2023-07-13 ENCOUNTER — TELEPHONE (OUTPATIENT)
Dept: PHARMACY | Facility: OTHER | Age: 68
End: 2023-07-13
Payer: COMMERCIAL

## 2023-07-13 ENCOUNTER — APPOINTMENT (OUTPATIENT)
Dept: INTERPRETER SERVICES | Facility: CLINIC | Age: 68
End: 2023-07-13
Payer: COMMERCIAL

## 2023-07-13 DIAGNOSIS — F33.9 EPISODE OF RECURRENT MAJOR DEPRESSIVE DISORDER, UNSPECIFIED DEPRESSION EPISODE SEVERITY (H): ICD-10-CM

## 2023-07-13 DIAGNOSIS — J45.30 MILD PERSISTENT ASTHMA WITHOUT COMPLICATION: ICD-10-CM

## 2023-07-13 DIAGNOSIS — E78.2 MIXED HYPERLIPIDEMIA: Primary | ICD-10-CM

## 2023-07-13 DIAGNOSIS — E78.2 MIXED HYPERLIPIDEMIA: ICD-10-CM

## 2023-07-13 DIAGNOSIS — J45.909 ASTHMA, UNSPECIFIED ASTHMA SEVERITY, UNSPECIFIED WHETHER COMPLICATED, UNSPECIFIED WHETHER PERSISTENT: ICD-10-CM

## 2023-07-13 DIAGNOSIS — F41.9 ANXIETY: ICD-10-CM

## 2023-07-13 PROCEDURE — 99605 MTMS BY PHARM NP 15 MIN: CPT | Performed by: PHARMACIST

## 2023-07-13 PROCEDURE — 99607 MTMS BY PHARM ADDL 15 MIN: CPT | Performed by: PHARMACIST

## 2023-07-13 RX ORDER — FLUTICASONE PROPIONATE 110 UG/1
1 AEROSOL, METERED RESPIRATORY (INHALATION) 2 TIMES DAILY
Qty: 12 G | Refills: 0 | Status: SHIPPED | OUTPATIENT
Start: 2023-07-13

## 2023-07-13 RX ORDER — SIMVASTATIN 40 MG
TABLET ORAL
Qty: 90 TABLET | Refills: 0 | Status: SHIPPED | OUTPATIENT
Start: 2023-07-13

## 2023-07-13 NOTE — Clinical Note
Dr. Coates, I met with Chrystal today for MTM (she was a recruitment patient via her insurance for MTM). She was unaware of how she is supposed to be using her inhalers, education provided to patient today. Additionally, patient requesting refill of all medications to get her by until her new visit at alternative clinic next month (patient states that there was not access at your clinic).  Please let me know if there are any questions or concerns.  Thank you Alexandrea Rankin, PharmD, BCACP Medication Therapy Management Pharmacist

## 2023-07-13 NOTE — PROGRESS NOTES
Medication Therapy Management (MTM) Encounter    ASSESSMENT:                            Medication Adherence/Access: Patient reports that she is about to run out of all of her medications and is requesting refill of all of them today.  Routing to PCP currently listed in chart to send refills of all medications, also requested that patient request refills from her retail pharmacy.    1. Mixed hyperlipidemia  Stable, continue current statin without change.     2. Episode of recurrent major depressive disorder, unspecified depression episode severity (H)  Patient reports stable on current medication, tolerating without issues.  No changes recommended.    3. Asthma, unspecified asthma severity, unspecified whether complicated, unspecified whether persistent  Identified during visit today the patient is inappropriately using inhalers for asthma.  MTM provided education on appropriate use of controller inhaler, Flovent 1 puff twice daily as prescribed.  Additionally, patient to use albuterol inhaler as needed.  Could consider switching to SMART therapy in the future.    PLAN:                            1. Reviewed with patient today appropriate instructions for use of Flovent and albuterol  2. Routing to PCP today to approve ongoing refill of all medications if deemed appropriate, also recommended that patient request refills from retail pharmacy    Follow-up: Return in about 4 weeks (around 8/10/2023) for PCP.    SUBJECTIVE/OBJECTIVE:                          Chrystal Sewell is a 67 year old female called for an initial visit. She was referred to me from Medicare recruitment patient. Patient seen with Ranulfo DENNIS .  Patient notes that she plans to be seen for primary care at a new clinic going forward since she was not able to schedule a visit with her old clinic.     Reason for visit: MTM initial, medicare recruitment.    Allergies/ADRs: Reviewed in chart  Past Medical History: Reviewed in chart  Tobacco: She reports  that she has never smoked. She has never been exposed to tobacco smoke. She has never used smokeless tobacco.  Alcohol: none    Medication Adherence/Access: Patient takes medications directly from bottles.  Patient takes medications 1 time(s) per day before bedtime.   Per patient, misses medication 0 times per week. Patient states that she never misses medication doses.     Hypercholesterolemia:   Simvastatin 40 mg daily at bedtime.   No issues reported of myalgias.     Anxiety:   Sertraline 50 mg - taking 1/2 tablet once daily at bedtime.   Reports current medication is working well for her. Mood, sleep, and appetite have been stable. Reports no medication side effects or concerns today.     Asthma:   Flovent 110 mcg - prescribed 1 puffs twice daily   Albuterol - prescribed 2 puffs every 4 hours as needed  Patient reports that she has been using the flovent only as needed twice daily at 2-3 puffs per time. Appears per Epic that this was last filled 9/28/22. Notes symptoms of shortness of breath/wheezing almost every day if doing activity or walking. Patient reports that she has an inhaler that she should use before activity - reports color is green/gray and L shape, though no fill history on file. Requesting refill of both inhalers today.     Heartburn: Reports that she previously was taking a medication (likely famotidine and sucralfate) for her stomach but has not needed these for a long time. No symptoms of heartburn recently. Prefers to have medications removed from her medication list.     Today's Vitals: There were no vitals taken for this visit.  ----------------      I spent 30 minutes with this patient today (an extra 15 minutes was spent creating the Medication Action Plan). I offer these suggestions for consideration by Divine Coates A copy of the visit note was provided to the patient's provider(s).    A summary of these recommendations was declined by the patient.    Alexandrea Rankin, PharmD,  BCACP  Medication Therapy Management Pharmacist    Telemedicine Visit Details  Type of service:  Telephone visit  Start Time: 11:51 AM  End Time: 12:21 PM     Medication Therapy Recommendations  Asthma, unspecified asthma severity, unspecified whether complicated, unspecified whether persistent    Current Medication: fluticasone (FLOVENT HFA) 110 MCG/ACT inhaler   Rationale: Does not understand instructions - Adherence - Adherence   Recommendation: Provide Education   Status: Patient Agreed - Adherence/Education

## 2023-07-13 NOTE — LETTER
July 13, 2023  Chrystal Donato  [address] [place] [address]    Dear Ms. Sewell, [facility]     Thank you for talking with me on Jul 13, 2023 about your health and medications. As a follow-up to our conversation, I have included two documents:      Your Recommended To-Do List has steps you should take to get the best results from your medications.  Your Medication List will help you keep track of your medications and how to take them.    If you want to talk about these documents, please call Alexandrea Rankin PharmD at phone: [phone], Monday-Friday 8-4:30pm.    I look forward to working with you and your doctors to make sure your medications work well for you.    Sincerely,  Alexandrea Rankin, PharmD  [facility] Pharmacist, [facility]

## 2023-07-13 NOTE — LETTER
_  Medication List        Prepared on: 07/13/2023     Bring your Medication List when you go to the doctor, hospital, or   emergency room. And, share it with your family or caregivers.     Note any changes to how you take your medications.  Cross out medications when you no longer use them.    Medication How I take it Why I use it Prescriber   albuterol (PROAIR HFA/PROVENTIL HFA/VENTOLIN HFA) 108 (90 Base) MCG/ACT inhaler Inhale 2 puffs into the lungs every 4 hours as needed for shortness of breath / dyspnea or wheezing Mild intermittent asthma without complication Divine Coates MD   fluticasone (FLOVENT HFA) 110 MCG/ACT inhaler Inhale 1 puff into the lungs 2 times daily Mild persistent asthma without complication Divine Coates MD   sertraline (ZOLOFT) 50 MG tablet Take 0.5 tablets (25 mg) by mouth daily Anxiety Divine Coates MD   simvastatin (ZOCOR) 40 MG tablet TAKE 1 TABLET (40 MG TOTAL) BY MOUTH AT BEDTIME/ TXHUA HMO THAUM MUS PW NOJ 1 LUB PAB ZOO NTSHAV MUAJ ROJ Mixed Hyperlipidemia Divine Coates MD         Add new medications, over-the-counter drugs, herbals, vitamins, or  minerals in the blank rows below.    Medication How I take it Why I use it Prescriber                                      Allergies:      No Known Allergies        Side effects I have had:               Other Information:              My notes and questions:

## 2023-07-13 NOTE — LETTER
"Recommended To-Do List      Prepared on: 07/13/2023       You can get the best results from your medications by completing the items on this \"To-Do List.\"      Bring your To-Do List when you go to your doctor. And, share it with your family or caregivers.    My To-Do List:  What we talked about: What I should do:   The importance of taking your medication as intended    Education: Use Flovent 110 mcg 1 puff twice daily and Albuterol inhaler 2 puffs every 4 hours as needed          What we talked about: What I should do:                       "

## 2023-07-13 NOTE — TELEPHONE ENCOUNTER
Contacted patient today regarding recruitment for an annual MTM visit to review medications. This appointment would be covered under patients insurance plan and no charge.   Agreed to visit today and completed.    Alexandrea Rankin, PharmD, [facility]  Medication Therapy Management Pharmacist

## 2024-01-29 ENCOUNTER — TRANSFERRED RECORDS (OUTPATIENT)
Dept: HEALTH INFORMATION MANAGEMENT | Facility: CLINIC | Age: 69
End: 2024-01-29
Payer: COMMERCIAL

## 2024-09-23 ENCOUNTER — TRANSFERRED RECORDS (OUTPATIENT)
Dept: HEALTH INFORMATION MANAGEMENT | Facility: CLINIC | Age: 69
End: 2024-09-23
Payer: COMMERCIAL

## 2024-11-01 ENCOUNTER — HOSPITAL ENCOUNTER (EMERGENCY)
Facility: CLINIC | Age: 69
Discharge: HOME | End: 2024-11-01
Attending: EMERGENCY MEDICINE | Admitting: EMERGENCY MEDICINE
Payer: COMMERCIAL

## 2024-11-01 VITALS
TEMPERATURE: 98.2 F | SYSTOLIC BLOOD PRESSURE: 132 MMHG | HEIGHT: 61 IN | BODY MASS INDEX: 28.13 KG/M2 | WEIGHT: 149 LBS | DIASTOLIC BLOOD PRESSURE: 74 MMHG | HEART RATE: 74 BPM | OXYGEN SATURATION: 98 % | RESPIRATION RATE: 16 BRPM

## 2024-11-01 DIAGNOSIS — R68.83 CHILLS: ICD-10-CM

## 2024-11-01 DIAGNOSIS — R42 LIGHTHEADEDNESS: ICD-10-CM

## 2024-11-01 LAB
ANION GAP SERPL CALCULATED.3IONS-SCNC: 12 MMOL/L (ref 7–15)
BASOPHILS # BLD AUTO: 0 10E3/UL (ref 0–0.2)
BASOPHILS NFR BLD AUTO: 0 %
BUN SERPL-MCNC: 15 MG/DL (ref 8–23)
CALCIUM SERPL-MCNC: 9.1 MG/DL (ref 8.8–10.4)
CHLORIDE SERPL-SCNC: 103 MMOL/L (ref 98–107)
CREAT SERPL-MCNC: 0.66 MG/DL (ref 0.51–0.95)
EGFRCR SERPLBLD CKD-EPI 2021: >90 ML/MIN/1.73M2
EOSINOPHIL # BLD AUTO: 0.2 10E3/UL (ref 0–0.7)
EOSINOPHIL NFR BLD AUTO: 3 %
ERYTHROCYTE [DISTWIDTH] IN BLOOD BY AUTOMATED COUNT: 12.4 % (ref 10–15)
FLUAV RNA SPEC QL NAA+PROBE: NEGATIVE
FLUBV RNA RESP QL NAA+PROBE: NEGATIVE
GLUCOSE SERPL-MCNC: 187 MG/DL (ref 70–99)
HCO3 SERPL-SCNC: 25 MMOL/L (ref 22–29)
HCT VFR BLD AUTO: 38.7 % (ref 35–47)
HGB BLD-MCNC: 13.1 G/DL (ref 11.7–15.7)
IMM GRANULOCYTES # BLD: 0 10E3/UL
IMM GRANULOCYTES NFR BLD: 0 %
LYMPHOCYTES # BLD AUTO: 1.6 10E3/UL (ref 0.8–5.3)
LYMPHOCYTES NFR BLD AUTO: 26 %
MCH RBC QN AUTO: 30.8 PG (ref 26.5–33)
MCHC RBC AUTO-ENTMCNC: 33.9 G/DL (ref 31.5–36.5)
MCV RBC AUTO: 91 FL (ref 78–100)
MONOCYTES # BLD AUTO: 0.3 10E3/UL (ref 0–1.3)
MONOCYTES NFR BLD AUTO: 5 %
NEUTROPHILS # BLD AUTO: 4.1 10E3/UL (ref 1.6–8.3)
NEUTROPHILS NFR BLD AUTO: 65 %
NRBC # BLD AUTO: 0 10E3/UL
NRBC BLD AUTO-RTO: 0 /100
PLATELET # BLD AUTO: 239 10E3/UL (ref 150–450)
POTASSIUM SERPL-SCNC: 3.5 MMOL/L (ref 3.4–5.3)
RBC # BLD AUTO: 4.26 10E6/UL (ref 3.8–5.2)
RSV RNA SPEC NAA+PROBE: NEGATIVE
SARS-COV-2 RNA RESP QL NAA+PROBE: NEGATIVE
SODIUM SERPL-SCNC: 140 MMOL/L (ref 135–145)
WBC # BLD AUTO: 6.3 10E3/UL (ref 4–11)

## 2024-11-01 PROCEDURE — 99283 EMERGENCY DEPT VISIT LOW MDM: CPT | Performed by: EMERGENCY MEDICINE

## 2024-11-01 PROCEDURE — 87637 SARSCOV2&INF A&B&RSV AMP PRB: CPT | Performed by: EMERGENCY MEDICINE

## 2024-11-01 PROCEDURE — 82947 ASSAY GLUCOSE BLOOD QUANT: CPT | Performed by: EMERGENCY MEDICINE

## 2024-11-01 PROCEDURE — 80048 BASIC METABOLIC PNL TOTAL CA: CPT | Performed by: EMERGENCY MEDICINE

## 2024-11-01 PROCEDURE — 36415 COLL VENOUS BLD VENIPUNCTURE: CPT | Performed by: EMERGENCY MEDICINE

## 2024-11-01 PROCEDURE — 85004 AUTOMATED DIFF WBC COUNT: CPT | Performed by: EMERGENCY MEDICINE

## 2024-11-01 ASSESSMENT — COLUMBIA-SUICIDE SEVERITY RATING SCALE - C-SSRS
1. IN THE PAST MONTH, HAVE YOU WISHED YOU WERE DEAD OR WISHED YOU COULD GO TO SLEEP AND NOT WAKE UP?: NO
2. HAVE YOU ACTUALLY HAD ANY THOUGHTS OF KILLING YOURSELF IN THE PAST MONTH?: NO
6. HAVE YOU EVER DONE ANYTHING, STARTED TO DO ANYTHING, OR PREPARED TO DO ANYTHING TO END YOUR LIFE?: NO

## 2024-11-01 ASSESSMENT — ACTIVITIES OF DAILY LIVING (ADL)
ADLS_ACUITY_SCORE: 0
ADLS_ACUITY_SCORE: 0

## 2024-11-01 NOTE — DISCHARGE INSTRUCTIONS
You were seen in the emergency department for an episode of chills and lightheadedness.  Your evaluation included lab testing, heart monitoring and all of our testing today has looked reassuring.  We did not see any signs of anemia, infection, COVID, abnormal heart rhythm, etc.  We are glad that symptoms seem to be settling down.  Please make sure you are drinking plenty of liquids as you return home.  Please review any lingering concerns with your clinic as soon as you can.  If you have other concerns like severe weakness, severe lightheadedness not getting better with drinking liquids and laying down, we can reevaluate at any time in the emergency department.

## 2024-11-01 NOTE — ED PROVIDER NOTES
EMERGENCY DEPARTMENT ENCOUNTER      NAME: Chrystal Sewell  AGE: 69 year old female  YOB: 1955  MRN: [id number]  EVALUATION DATE & TIME: 2024 12:34 PM    PCP: Divine Coates    ED PROVIDER: Leland White M.D.      Chief Complaint   Patient presents with    Fever    Dizziness         FINAL IMPRESSION:  1. Lightheadedness    2. Chills          ED COURSE & MEDICAL DECISION MAKIN year old female presents to the Emergency Department for evaluation of chills, lightheadedness.  Patient is vitally stable and nontoxic-appearing which arrives to the emergency department.  Cardiopulmonary exam is unremarkable.  She had sudden onset of the symptoms.  Consideration was given for etiologies including infection given her reported fever.  She tested negative for COVID and influenza.  She has a normal white blood cell count.  She is afebrile throughout the duration of her emergency department evaluation does not have any other symptoms of cough, congestion, abdominal pain, dysuria, or other apparent focus of infection.  EKG shows sinus rhythm and she is maintained in this on the cardiac monitor here.  Other metabolic labs look stable.  The patient was observed here for a time while her workup was ongoing and she was feeling improved upon recheck without much for interventions here.   fairly low suspicion at this time for anything like arrhythmia.  Without any chest pain would be doubtful of anything like pulmonary embolism.  Doubt sepsis based on reassuring evaluation and improving symptoms.  We discussed the results of her workup and supportive treatment plan for home.  Patient and family now fairly convinced that this may have represented something like an anxiety attack.  She declined any medications for this.  She will follow-up with her primary doctor.  Patient discharged in stable condition.    At the conclusion of the encounter I discussed the results of all of the tests and the disposition. The  questions were answered. The patient or family acknowledged understanding and was agreeable with the care plan.       Medical Decision Making  Obtained supplemental history:Supplemental history obtained?: No  Reviewed external records: External records reviewed?: No  Care impacted by chronic illness:Hyperlipidemia and Mental Health  Did you consider but not order tests?: Work up considered but not performed and documented in chart, if applicable  Did you interpret images independently?: Independent interpretation of ECG and images noted in documentation, when applicable.  Consultation discussion with other provider:Did you involve another provider (consultant, , pharmacy, etc.)?: No  Discharge. No recommendations on prescription strength medication(s). N/A.    MIPS: Not Applicable          MEDICATIONS GIVEN IN THE EMERGENCY:  Medications - No data to display    NEW PRESCRIPTIONS STARTED AT TODAY'S ER VISIT  Discharge Medication List as of 11/1/2024  2:50 PM             =================================================================    HPI    Patient information was obtained from: Patient, Family member    Use of : Offered but declined, family interpreting        Chrystal Sewell is a 69 year old female who presents to this ED today for evaluation of chills, lightheadedness.  Patient reports feeling well yesterday.  This morning she developed sudden onset of a chill and flushed sensation throughout her entire body.  She feels like this may have been a fever.  She says the last couple days she has had a mild sore throat but no difficulty swallowing.  She also feels a bit more short of breath and lightheaded since that episode started.  She thinks that things have slowly been improving over the last half an hour as she was getting triaged and checked into the room.  She denies severe pain in her chest or abdomen.  Denies headache.  Denies cough.  Denies leg swelling or pain.      REVIEW OF SYSTEMS   All systems  "reviewed and negative except as noted in HPI.    PAST MEDICAL HISTORY:  No past medical history on file.    PAST SURGICAL HISTORY:  No past surgical history on file.        CURRENT MEDICATIONS:    No current facility-administered medications for this encounter.     Current Outpatient Medications   Medication Sig Dispense Refill    albuterol (PROAIR HFA/PROVENTIL HFA/VENTOLIN HFA) 108 (90 Base) MCG/ACT inhaler Inhale 2 puffs into the lungs every 4 hours as needed for shortness of breath / dyspnea or wheezing 18 g 4    fluticasone (FLOVENT HFA) 110 MCG/ACT inhaler Inhale 1 puff into the lungs 2 times daily 12 g 0    sertraline (ZOLOFT) 50 MG tablet Take 0.5 tablets (25 mg) by mouth daily 45 tablet 0    simvastatin (ZOCOR) 40 MG tablet TAKE 1 TABLET (40 MG TOTAL) BY MOUTH AT BEDTIME/ TXHUA HMO THAUM MUS PW NOJ 1 LUB PAB ZOO NTSHAV MUAJ ROJ 90 tablet 0         ALLERGIES:  No Known Allergies    FAMILY HISTORY:  Family History   Problem Relation Age of Onset    No Known Problems Mother     No Known Problems Father     No Known Problems Sister     No Known Problems Daughter     No Known Problems Maternal Grandmother     No Known Problems Maternal Grandfather     No Known Problems Paternal Grandmother     No Known Problems Paternal Grandfather     No Known Problems Maternal Aunt     No Known Problems Paternal Aunt     Hereditary Breast and Ovarian Cancer Syndrome No family hx of     Breast Cancer No family hx of     Cancer No family hx of     Colon Cancer No family hx of     Endometrial Cancer No family hx of     Ovarian Cancer No family hx of        SOCIAL HISTORY:   Social History     Socioeconomic History    Marital status:    Tobacco Use    Smoking status: Never     Passive exposure: Never    Smokeless tobacco: Never   Substance and Sexual Activity    Alcohol use: No    Drug use: No       VITALS:  /74   Pulse 74   Temp 98.2  F (36.8  C) (Oral)   Resp 16   Ht 1.549 m (5' 1\")   Wt 67.6 kg (149 lb)   " SpO2 98%   BMI 28.15 kg/m      PHYSICAL EXAM    Constitutional: Well developed, Well nourished, NAD.  HENT: Normocephalic, Atraumatic. Neck Supple.  Eyes: EOMI, Conjunctiva normal.  Respiratory: Breathing comfortably on room air. Speaks full sentences easily. Lungs clear to ascultation.  Cardiovascular: Normal heart rate, Regular rhythm. No peripheral edema.  Abdomen: Soft, nontender  Musculoskeletal: Good range of motion in all major joints. No major deformities noted.  Integument: Warm, Dry.  Neurologic: Alert & awake, Normal motor function, Normal sensory function, No focal deficits noted.   Psychiatric: Cooperative. Affect appropriate.     LAB:  All pertinent labs reviewed and interpreted.  Labs Ordered and Resulted from Time of ED Arrival to Time of ED Departure   BASIC METABOLIC PANEL - Abnormal       Result Value    Sodium 140      Potassium 3.5      Chloride 103      Carbon Dioxide (CO2) 25      Anion Gap 12      Urea Nitrogen 15.0      Creatinine 0.66      GFR Estimate >90      Calcium 9.1      Glucose 187 (*)    INFLUENZA A/B, RSV, & SARS-COV2 PCR - Normal    Influenza A PCR Negative      Influenza B PCR Negative      RSV PCR Negative      SARS CoV2 PCR Negative     CBC WITH PLATELETS AND DIFFERENTIAL    WBC Count 6.3      RBC Count 4.26      Hemoglobin 13.1      Hematocrit 38.7      MCV 91      MCH 30.8      MCHC 33.9      RDW 12.4      Platelet Count 239      % Neutrophils 65      % Lymphocytes 26      % Monocytes 5      % Eosinophils 3      % Basophils 0      % Immature Granulocytes 0      NRBCs per 100 WBC 0      Absolute Neutrophils 4.1      Absolute Lymphocytes 1.6      Absolute Monocytes 0.3      Absolute Eosinophils 0.2      Absolute Basophils 0.0      Absolute Immature Granulocytes 0.0      Absolute NRBCs 0.0         RADIOLOGY:  Reviewed all pertinent imaging. Please see official radiology report.  No orders to display       EKG:    Performed at: 1705    Impression: Sinus rhythm, normal  ECG    Rate: 76  Rhythm: Sinus  Axis: Normal  AR Interval: 166  QRS Interval: 95  QTc Interval: 450  ST Changes: None significant  Comparison: Compared to May 8, 2005, no significant change    I have independently reviewed and interpreted the EKG(s) documented above.        Leland White M.D.  Emergency Medicine  [facility] EMERGENCY ROOM  [facility] [facility] [address]  [phone]  Dept: [phone]       Leland White MD  11/05/24 0620

## 2024-11-01 NOTE — ED NOTES
Introduced self to patient.  Whiteboard updated.  Plan of care and length of time discussed with patient.  Will continue to monitor. Hattie Corrales RN.......11/1/2024 1:06 PM

## 2024-11-01 NOTE — ED TRIAGE NOTES
Woke this morning with subjective fever and weakness and lightheadedness. C/O a mild sore throat.  Feeling chilled.  No medications PTA.     Triage Assessment (Adult)       Row Name 11/01/24 1232          Triage Assessment    Airway WDL WDL        Respiratory WDL    Respiratory WDL WDL        Skin Circulation/Temperature WDL    Skin Circulation/Temperature WDL WDL        Cardiac WDL    Cardiac WDL WDL        Cognitive/Neuro/Behavioral WDL    Cognitive/Neuro/Behavioral WDL WDL